# Patient Record
Sex: FEMALE | Race: WHITE | NOT HISPANIC OR LATINO | Employment: OTHER | URBAN - METROPOLITAN AREA
[De-identification: names, ages, dates, MRNs, and addresses within clinical notes are randomized per-mention and may not be internally consistent; named-entity substitution may affect disease eponyms.]

---

## 2017-05-11 ENCOUNTER — TRANSCRIBE ORDERS (OUTPATIENT)
Dept: ADMINISTRATIVE | Facility: HOSPITAL | Age: 77
End: 2017-05-11

## 2017-05-11 DIAGNOSIS — Z00.00 ROUTINE CHECK-UP: Primary | ICD-10-CM

## 2017-05-30 ENCOUNTER — HOSPITAL ENCOUNTER (OUTPATIENT)
Dept: BONE DENSITY | Facility: IMAGING CENTER | Age: 77
Discharge: HOME | End: 2017-05-30
Payer: COMMERCIAL

## 2017-05-30 DIAGNOSIS — Z00.00 ROUTINE CHECK-UP: ICD-10-CM

## 2017-05-30 PROCEDURE — 77080 DXA BONE DENSITY AXIAL: CPT

## 2018-11-25 ENCOUNTER — HOSPITAL ENCOUNTER (EMERGENCY)
Facility: HOSPITAL | Age: 78
Discharge: HOME | End: 2018-11-25
Attending: EMERGENCY MEDICINE | Admitting: EMERGENCY MEDICINE
Payer: COMMERCIAL

## 2018-11-25 VITALS
RESPIRATION RATE: 22 BRPM | WEIGHT: 138 LBS | HEIGHT: 67 IN | BODY MASS INDEX: 21.66 KG/M2 | HEART RATE: 96 BPM | OXYGEN SATURATION: 98 % | TEMPERATURE: 98.7 F | DIASTOLIC BLOOD PRESSURE: 95 MMHG | SYSTOLIC BLOOD PRESSURE: 192 MMHG

## 2018-11-25 DIAGNOSIS — I10 HYPERTENSION: ICD-10-CM

## 2018-11-25 DIAGNOSIS — R51.9 HEADACHE: Primary | ICD-10-CM

## 2018-11-25 LAB
ALBUMIN SERPL BCP-MCNC: 3.9 G/DL (ref 3.5–5)
ALP SERPL-CCNC: 93 U/L (ref 46–116)
ALT SERPL W P-5'-P-CCNC: 32 U/L (ref 12–78)
ANION GAP SERPL CALCULATED.3IONS-SCNC: 8 MMOL/L (ref 4–13)
AST SERPL W P-5'-P-CCNC: 20 U/L (ref 5–45)
ATRIAL RATE: 97 BPM
BASOPHILS # BLD AUTO: 0.02 THOUSANDS/ΜL (ref 0–0.1)
BASOPHILS NFR BLD AUTO: 0 % (ref 0–1)
BILIRUB SERPL-MCNC: 0.4 MG/DL (ref 0.2–1)
BUN SERPL-MCNC: 18 MG/DL (ref 5–25)
CALCIUM SERPL-MCNC: 9 MG/DL (ref 8.3–10.1)
CHLORIDE SERPL-SCNC: 104 MMOL/L (ref 100–108)
CO2 SERPL-SCNC: 31 MMOL/L (ref 21–32)
CREAT SERPL-MCNC: 0.84 MG/DL (ref 0.6–1.3)
EOSINOPHIL # BLD AUTO: 0.01 THOUSAND/ΜL (ref 0–0.61)
EOSINOPHIL NFR BLD AUTO: 0 % (ref 0–6)
ERYTHROCYTE [DISTWIDTH] IN BLOOD BY AUTOMATED COUNT: 14.4 % (ref 11.6–15.1)
GFR SERPL CREATININE-BSD FRML MDRD: 66 ML/MIN/1.73SQ M
GLUCOSE SERPL-MCNC: 117 MG/DL (ref 65–140)
HCT VFR BLD AUTO: 43.1 % (ref 34.8–46.1)
HGB BLD-MCNC: 13.8 G/DL (ref 11.5–15.4)
IMM GRANULOCYTES # BLD AUTO: 0.01 THOUSAND/UL (ref 0–0.2)
IMM GRANULOCYTES NFR BLD AUTO: 0 % (ref 0–2)
LYMPHOCYTES # BLD AUTO: 0.83 THOUSANDS/ΜL (ref 0.6–4.47)
LYMPHOCYTES NFR BLD AUTO: 17 % (ref 14–44)
MAGNESIUM SERPL-MCNC: 2.1 MG/DL (ref 1.6–2.6)
MCH RBC QN AUTO: 30.3 PG (ref 26.8–34.3)
MCHC RBC AUTO-ENTMCNC: 32 G/DL (ref 31.4–37.4)
MCV RBC AUTO: 95 FL (ref 82–98)
MONOCYTES # BLD AUTO: 0.22 THOUSAND/ΜL (ref 0.17–1.22)
MONOCYTES NFR BLD AUTO: 5 % (ref 4–12)
NEUTROPHILS # BLD AUTO: 3.68 THOUSANDS/ΜL (ref 1.85–7.62)
NEUTS SEG NFR BLD AUTO: 78 % (ref 43–75)
NRBC BLD AUTO-RTO: 0 /100 WBCS
P AXIS: 74 DEGREES
PLATELET # BLD AUTO: 184 THOUSANDS/UL (ref 149–390)
PMV BLD AUTO: 10.3 FL (ref 8.9–12.7)
POTASSIUM SERPL-SCNC: 4.4 MMOL/L (ref 3.5–5.3)
PR INTERVAL: 162 MS
PROT SERPL-MCNC: 7.5 G/DL (ref 6.4–8.2)
QRS AXIS: 63 DEGREES
QRSD INTERVAL: 104 MS
QT INTERVAL: 380 MS
QTC INTERVAL: 482 MS
RBC # BLD AUTO: 4.55 MILLION/UL (ref 3.81–5.12)
SODIUM SERPL-SCNC: 143 MMOL/L (ref 136–145)
T WAVE AXIS: 77 DEGREES
VENTRICULAR RATE: 97 BPM
WBC # BLD AUTO: 4.77 THOUSAND/UL (ref 4.31–10.16)

## 2018-11-25 PROCEDURE — 83735 ASSAY OF MAGNESIUM: CPT | Performed by: EMERGENCY MEDICINE

## 2018-11-25 PROCEDURE — 93005 ELECTROCARDIOGRAM TRACING: CPT

## 2018-11-25 PROCEDURE — 96374 THER/PROPH/DIAG INJ IV PUSH: CPT

## 2018-11-25 PROCEDURE — 80053 COMPREHEN METABOLIC PANEL: CPT | Performed by: EMERGENCY MEDICINE

## 2018-11-25 PROCEDURE — 99284 EMERGENCY DEPT VISIT MOD MDM: CPT

## 2018-11-25 PROCEDURE — 85025 COMPLETE CBC W/AUTO DIFF WBC: CPT | Performed by: EMERGENCY MEDICINE

## 2018-11-25 PROCEDURE — 93010 ELECTROCARDIOGRAM REPORT: CPT | Performed by: INTERNAL MEDICINE

## 2018-11-25 PROCEDURE — 36415 COLL VENOUS BLD VENIPUNCTURE: CPT | Performed by: EMERGENCY MEDICINE

## 2018-11-25 RX ORDER — HYDRALAZINE HYDROCHLORIDE 20 MG/ML
10 INJECTION INTRAMUSCULAR; INTRAVENOUS ONCE
Status: COMPLETED | OUTPATIENT
Start: 2018-11-25 | End: 2018-11-25

## 2018-11-25 RX ADMIN — HYDRALAZINE HYDROCHLORIDE 10 MG: 20 INJECTION INTRAMUSCULAR; INTRAVENOUS at 14:25

## 2018-11-25 NOTE — ED PROVIDER NOTES
History  Chief Complaint   Patient presents with    Hypertension     Patient present sto the ER stating she was sent from care now for hypertension     Patient complains of red eyes 2 days now then mild frontal headache last night  Taking herbal eye drops without relief  Checked BP this morning and it was elevated  Prior to Admission Medications   Prescriptions Last Dose Informant Patient Reported? Taking? ARMOUR THYROID PO   Yes Yes   Sig: Take by mouth      Facility-Administered Medications: None       Past Medical History:   Diagnosis Date    Arthritis     Disease of thyroid gland     Hypertension        History reviewed  No pertinent surgical history  History reviewed  No pertinent family history  I have reviewed and agree with the history as documented  Social History   Substance Use Topics    Smoking status: Never Smoker    Smokeless tobacco: Never Used    Alcohol use Yes        Review of Systems   All other systems reviewed and are negative  Physical Exam  Physical Exam   Constitutional: She appears well-developed and well-nourished  HENT:   Mouth/Throat: Oropharynx is clear and moist    Eyes: Pupils are equal, round, and reactive to light  Conjunctivae and EOM are normal    Neck: Normal range of motion  Neck supple  No spinous process tenderness present  Cardiovascular: Normal rate, regular rhythm, normal heart sounds and intact distal pulses  Pulmonary/Chest: Effort normal and breath sounds normal  No respiratory distress  She has no wheezes  Abdominal: Soft  Bowel sounds are normal  She exhibits no distension  There is no tenderness  Musculoskeletal: Normal range of motion  Neurological: She is alert  She has normal strength  No sensory deficit  GCS eye subscore is 4  GCS verbal subscore is 5  GCS motor subscore is 6  Skin: Skin is warm and dry  No rash noted  Psychiatric: Her mood appears anxious  Nursing note and vitals reviewed        Vital Signs  ED Triage Vitals   Temperature Pulse Respirations Blood Pressure SpO2   11/25/18 1157 11/25/18 1157 11/25/18 1157 11/25/18 1157 11/25/18 1157   98 7 °F (37 1 °C) (!) 108 20 (!) 209/109 97 %      Temp Source Heart Rate Source Patient Position - Orthostatic VS BP Location FiO2 (%)   11/25/18 1157 11/25/18 1157 11/25/18 1157 11/25/18 1157 --   Temporal Monitor Sitting Right arm       Pain Score       11/25/18 1150       No Pain           Vitals:    11/25/18 1425 11/25/18 1430 11/25/18 1445 11/25/18 1500   BP: (!) 210/99 (!) 210/102 (!) 192/95    Pulse:  97 94 96   Patient Position - Orthostatic VS:           Visual Acuity      ED Medications  Medications   hydrALAZINE (APRESOLINE) injection 10 mg (10 mg Intravenous Given 11/25/18 1425)       Diagnostic Studies  Results Reviewed     Procedure Component Value Units Date/Time    Magnesium [[id number]]  (Normal) Collected:  11/25/18 1237    Lab Status:  Final result Specimen:  Blood from Arm, Left Updated:  11/25/18 1412     Magnesium 2 1 mg/dL     Comprehensive metabolic panel [[id number]] Collected:  11/25/18 1237    Lab Status:  Final result Specimen:  Blood from Arm, Left Updated:  11/25/18 1306     Sodium 143 mmol/L      Potassium 4 4 mmol/L      Chloride 104 mmol/L      CO2 31 mmol/L      ANION GAP 8 mmol/L      BUN 18 mg/dL      Creatinine 0 84 mg/dL      Glucose 117 mg/dL      Calcium 9 0 mg/dL      AST 20 U/L      ALT 32 U/L      Alkaline Phosphatase 93 U/L      Total Protein 7 5 g/dL      Albumin 3 9 g/dL      Total Bilirubin 0 40 mg/dL      eGFR 66 ml/min/1 73sq m     Narrative:         National Kidney Disease Education Program recommendations are as follows:  GFR calculation is accurate only with a steady state creatinine  Chronic Kidney disease less than 60 ml/min/1 73 sq  meters  Kidney failure less than 15 ml/min/1 73 sq  meters      CBC and differential [[id number]]  (Abnormal) Collected:  11/25/18 1237    Lab Status:  Final result Specimen:  Blood from Arm, Left Updated:  11/25/18 1248     WBC 4 77 Thousand/uL      RBC 4 55 Million/uL      Hemoglobin 13 8 g/dL      Hematocrit 43 1 %      MCV 95 fL      MCH 30 3 pg      MCHC 32 0 g/dL      RDW 14 4 %      MPV 10 3 fL      Platelets 496 Thousands/uL      nRBC 0 /100 WBCs      Neutrophils Relative 78 (H) %      Immat GRANS % 0 %      Lymphocytes Relative 17 %      Monocytes Relative 5 %      Eosinophils Relative 0 %      Basophils Relative 0 %      Neutrophils Absolute 3 68 Thousands/µL      Immature Grans Absolute 0 01 Thousand/uL      Lymphocytes Absolute 0 83 Thousands/µL      Monocytes Absolute 0 22 Thousand/µL      Eosinophils Absolute 0 01 Thousand/µL      Basophils Absolute 0 02 Thousands/µL                  No orders to display              Procedures  ECG 12 Lead Documentation  Date/Time: 11/25/2018 7:27 PM  Performed by: Michael Mckeon  Authorized by: Michael Mckeon     Indications / Diagnosis:  Htn  ECG reviewed by me, the ED Provider: yes    Patient location:  ED  Previous ECG:     Previous ECG:  Compared to current    Comparison ECG info:  2015    Similarity:  Changes noted  Interpretation:     Interpretation: non-specific    Rate:     ECG rate:  97    ECG rate assessment: normal    Rhythm:     Rhythm: sinus rhythm    Ectopy:     Ectopy: none    QRS:     QRS axis:  Normal    QRS intervals:  Normal  Conduction:     Conduction: abnormal      Abnormal conduction: incomplete RBBB    ST segments:     ST segments:  Normal  T waves:     T waves: normal    Other findings:     Other findings: DEANNE and prolonged qTc interval             Phone Contacts  ED Phone Contact    ED Course  ED Course as of Nov 25 2020   Sun Nov 25, 2018   1424 Patient denies anxiety but appears very anxious    1502 Patient states that she has white coat syndrome                                MDM  Number of Diagnoses or Management Options  Headache: new and requires workup  Hypertension: new and requires workup     Amount and/or Complexity of Data Reviewed  Clinical lab tests: ordered and reviewed    Patient Progress  Patient progress: improved    CritCare Time    Disposition  Final diagnoses:   Headache   Hypertension     Time reflects when diagnosis was documented in both MDM as applicable and the Disposition within this note     Time User Action Codes Description Comment    11/25/2018  2:31 PM Dana Peter Add [R51] Headache     11/25/2018  2:31 PM Dina Cruz Hypertension       ED Disposition     ED Disposition Condition Comment    Discharge  Brocknurys Ramirezpushpa discharge to home/self care  Condition at discharge: Good        Follow-up Information     Follow up With Specialties Details Why Contact Info Additional Information    Lucrecia Mendez DO Family Medicine In 1 day  [facility] [address] [facility]   [address] [facility] [address]  [address]  Cardiology [phone], [address] [place], [place], [address]          Discharge Medication List as of 11/25/2018  3:02 PM      CONTINUE these medications which have NOT CHANGED    Details   ARMOUR THYROID PO Take by mouth, Historical Med           No discharge procedures on file      ED Provider  Electronically Signed by           Johnny Diaz DO  11/25/18 2020

## 2018-11-25 NOTE — ED NOTES
Pt  Discharged home  Discharge instructions reviewed, understanding verbalized  Pt  Left ED with her family member        Lindsay Conley RN  11/25/18 1133

## 2019-01-25 ENCOUNTER — TRANSCRIBE ORDERS (OUTPATIENT)
Dept: ADMINISTRATIVE | Facility: HOSPITAL | Age: 79
End: 2019-01-25

## 2019-01-25 ENCOUNTER — OFFICE VISIT (OUTPATIENT)
Dept: CARDIOLOGY CLINIC | Facility: CLINIC | Age: 79
End: 2019-01-25
Payer: COMMERCIAL

## 2019-01-25 VITALS
WEIGHT: 140 LBS | BODY MASS INDEX: 21.97 KG/M2 | SYSTOLIC BLOOD PRESSURE: 198 MMHG | HEIGHT: 67 IN | HEART RATE: 84 BPM | DIASTOLIC BLOOD PRESSURE: 88 MMHG

## 2019-01-25 DIAGNOSIS — R01.1 MURMUR: Primary | ICD-10-CM

## 2019-01-25 PROCEDURE — 99203 OFFICE O/P NEW LOW 30 MIN: CPT | Performed by: INTERNAL MEDICINE

## 2019-01-25 RX ORDER — LEVOTHYROXINE, LIOTHYRONINE 19; 4.5 UG/1; UG/1
30 TABLET ORAL DAILY
Refills: 0 | COMMUNITY
Start: 2019-01-14 | End: 2021-03-29

## 2019-01-25 RX ORDER — LISINOPRIL 5 MG/1
TABLET ORAL
Refills: 0 | COMMUNITY
Start: 2019-01-23

## 2019-01-25 RX ORDER — LEVOTHYROXINE, LIOTHYRONINE 9.5; 2.25 UG/1; UG/1
15 TABLET ORAL EVERY EVENING
Refills: 0 | COMMUNITY
Start: 2019-01-14 | End: 2021-03-29

## 2019-01-25 RX ORDER — MELATONIN 3 MG
10 TABLET ORAL
COMMUNITY

## 2019-01-25 RX ORDER — GLUCOSA SU 2KCL/CHONDROITIN SU 500-400 MG
CAPSULE ORAL
COMMUNITY

## 2019-01-25 NOTE — PROGRESS NOTES
Consultation - Cardiology   Sabina Sellers [de-identified] y o  female MRN: [id number]    Encounter: [id number]    Assessment/Plan     Assessment:       HTN:       Plan:    Her resting BP at home is typically within a controlled range  She is high here today but that is not unusual for her  She does have a murmur c/w mild aortic stenosis and will do an echocardiogram      History of Present Illness   Physician Requesting Consult: No att  providers found  Reason for Consult / Principal Problem: Hypertension      HPI: Sabina Sellers is a [de-identified]y o  year old female who presents with a history of Hypertension  She has been on Lisinopril since the end of November  She did have an ER visit due to uncontrolled HTN  She started checking her blood pressure at home  Her resting BP at home has been in the 580 - 799 systolic when resting  She has no chest discomfort, no dyspnea and no palpitations  She has done lifeline that revealed mild carotid atherosclerosis  She has no prior history of CHF, CAD or arrhythmia  Mother had Acute MI in her late 45s  Review of Systems   Constitution: Negative  HENT: Negative  Eyes: Negative  Cardiovascular: Negative  Respiratory: Negative  Endocrine: Negative  Hematologic/Lymphatic: Negative  Skin: Negative  Musculoskeletal: Negative  Gastrointestinal: Negative  Genitourinary: Negative  Neurological: Negative  Psychiatric/Behavioral: Negative  Allergic/Immunologic: Negative  Historical Information   Past Medical History:   Diagnosis Date    Arthritis     Disease of thyroid gland     Hypertension      No past surgical history on file  Social History:  History   Alcohol Use    Yes     History   Drug Use No     History   Smoking Status    Never Smoker   Smokeless Tobacco    Never Used       Family History:   No family history on file      Meds/Allergies   Allergies   Allergen Reactions    Sulfur-Salicylic Acid [Salicylic Acid-Sulfur] Swelling Current Outpatient Prescriptions:     ARMOUR THYROID PO, Take 120 mg by mouth  , Disp: , Rfl:     Cholecalciferol (VITAMIN D3 PO), Take 25 mg by mouth, Disp: , Rfl:     Coenzyme Q10 (CO Q10) 100 MG CAPS, Take by mouth, Disp: , Rfl:     DHEA 10 MG TABS, Take 10 mg by mouth, Disp: , Rfl:     lisinopril (ZESTRIL) 5 mg tablet, , Disp: , Rfl: 0    NP THYROID 15 MG tablet, Take 15 mg by mouth every evening, Disp: , Rfl: 0    NP THYROID 30 MG tablet, Take 30 mg by mouth daily, Disp: , Rfl: 0    Zinc Sulfate (ZINC 15 PO), Take by mouth, Disp: , Rfl:     Vitals:   Pulse: 84  Blood Pressue: (!) 198/88  Weight: 63 5 kg (140 lb)      Physical Exam   Constitutional: She is oriented to person, place, and time  No distress  HENT:   Mouth/Throat: No oropharyngeal exudate  Eyes: No scleral icterus  Neck: No JVD present  Cardiovascular: Normal rate and regular rhythm  No murmur heard  Pulmonary/Chest: Effort normal and breath sounds normal  No respiratory distress  She has no wheezes  She has no rales  Abdominal: Soft  Bowel sounds are normal  She exhibits no distension  There is no tenderness  There is no rebound  Musculoskeletal: She exhibits no edema  Neurological: She is alert and oriented to person, place, and time  Skin: Skin is warm and dry  She is not diaphoretic  Psychiatric: She has a normal mood and affect  Her behavior is normal        [unfilled]    Invasive Devices     Peripheral Intravenous Line            Peripheral IV 11/25/18 Left Antecubital 60 days                Lab Results   Component Value Date     11/25/2018    CO2 31 11/25/2018    BUN 18 11/25/2018    CREATININE 0 84 11/25/2018    EGFR 66 11/25/2018    CALCIUM 9 0 11/25/2018    AST 20 11/25/2018    ALT 32 11/25/2018    ALKPHOS 93 11/25/2018     Lab Results   Component Value Date    WBC 4 77 11/25/2018    HGB 13 8 11/25/2018     11/25/2018     No components found for: TROP    Imaging:     EKG: NSR   Incomplete RBBB  LVH  Counseling / Coordination of Care  Total floor / unit time spent today 45 minutes  Greater than 50% of total time was spent with the patient and / or family counseling and / or coordination of care  A description of the counseling / coordination of care

## 2019-02-15 ENCOUNTER — HOSPITAL ENCOUNTER (OUTPATIENT)
Dept: NON INVASIVE DIAGNOSTICS | Facility: CLINIC | Age: 79
Discharge: HOME | End: 2019-02-15
Payer: COMMERCIAL

## 2019-02-15 DIAGNOSIS — R01.1 MURMUR: ICD-10-CM

## 2019-02-15 PROCEDURE — 93306 TTE W/DOPPLER COMPLETE: CPT

## 2019-02-15 PROCEDURE — 93306 TTE W/DOPPLER COMPLETE: CPT | Performed by: INTERNAL MEDICINE

## 2019-02-18 ENCOUNTER — TELEPHONE (OUTPATIENT)
Dept: CARDIOLOGY CLINIC | Facility: CLINIC | Age: 79
End: 2019-02-18

## 2019-02-18 NOTE — TELEPHONE ENCOUNTER
Called pt - message relayed as given  Last OV and Echo routed to PCP per pt's request '  No questions at this time

## 2019-02-18 NOTE — TELEPHONE ENCOUNTER
----- Message from Shelia Molina MD sent at 2/18/2019 10:15 AM EST -----  Mild aortic stenosis  Overall looks good

## 2019-05-02 ENCOUNTER — TELEPHONE (OUTPATIENT)
Dept: GASTROENTEROLOGY | Facility: CLINIC | Age: 79
End: 2019-05-02

## 2019-11-25 ENCOUNTER — CLINICAL SUPPORT (OUTPATIENT)
Dept: GASTROENTEROLOGY | Facility: CLINIC | Age: 79
End: 2019-11-25

## 2019-11-25 VITALS — BODY MASS INDEX: 21.97 KG/M2 | HEIGHT: 67 IN | WEIGHT: 140 LBS

## 2019-11-25 DIAGNOSIS — Z86.010 HISTORY OF COLON POLYPS: Primary | ICD-10-CM

## 2019-11-25 NOTE — PROGRESS NOTES
Pt seen for colon prep dx history of polyps  Meds reviewed with pt  Instructions given  Screening form signed  Suprep rx sent to pharmacy   Colon bmec 1/14/20 ZARI

## 2019-12-05 ENCOUNTER — TELEPHONE (OUTPATIENT)
Dept: GASTROENTEROLOGY | Facility: CLINIC | Age: 79
End: 2019-12-05

## 2019-12-05 NOTE — TELEPHONE ENCOUNTER
Vm box is full  Could not leave a message  Per RA, Suprep is not covered  I put a sample up front for pt to

## 2020-01-06 NOTE — TELEPHONE ENCOUNTER
Pt called, pharmacy said prep not covered by insurance  Will be $100  Told her we were trying to get a hold of her to offer her a sample  She will pick it up in the office tomorrow

## 2021-01-18 ENCOUNTER — OFFICE VISIT (OUTPATIENT)
Dept: VASCULAR SURGERY | Facility: CLINIC | Age: 81
End: 2021-01-18
Payer: COMMERCIAL

## 2021-01-18 VITALS
RESPIRATION RATE: 17 BRPM | DIASTOLIC BLOOD PRESSURE: 70 MMHG | TEMPERATURE: 98.7 F | HEART RATE: 113 BPM | HEIGHT: 67 IN | BODY MASS INDEX: 22.6 KG/M2 | WEIGHT: 144 LBS | SYSTOLIC BLOOD PRESSURE: 126 MMHG

## 2021-01-18 DIAGNOSIS — I83.893 SYMPTOMATIC VARICOSE VEINS OF BOTH LOWER EXTREMITIES: Primary | ICD-10-CM

## 2021-01-18 PROCEDURE — 99203 OFFICE O/P NEW LOW 30 MIN: CPT | Performed by: NURSE PRACTITIONER

## 2021-01-18 NOTE — PROGRESS NOTES
Assessment/Plan:    Symptomatic varicose veins of both lower extremities    26-year-old female with hypothyroid and longstanding bilateral extremity varicosities referred for vascular evaluation   -Intermittent discomfort associated with varicosities   -We discussed physiology of venous disease, available treatment options and indications for treatment   -Recommended conservative measures   -Rx light  Gradient compression  Socks given   -Frequent ambulation and periodic leg elevation  -Follow up in the office in 2 months to check symptoms with the use of compression       Diagnoses and all orders for this visit:    Symptomatic varicose veins of both lower extremities  -     Compression Stocking          Subjective:      Patient ID: Lexi Alston is a 78 y o  female  Patient presents in office today as a new varicose vein patient who was referral by Dylan Rodriguez  Patient c/o of having B/L spider veins and varicose veins, that occur in her 70's  Patient reports that at times they can be painful when walking  Patient has not worn any compression stockings but does elevate her legs at night  Patient denies having numbness or tingling in her legs  HPI   patient presents to the office for evaluation bilateral lower extremity varicosities  She has longstanding history of bilateral lower extremity varicosities  In the last several months she has noticed they have become increasingly tender  She has Diffusely scattered bilateral lower extremity varicosities  No clinical evidence of superficial thrombophlebitis  No significant lower extremity swelling  The following portions of the patient's history were reviewed and updated as appropriate: allergies, current medications, past family history, past medical history, past social history, past surgical history and problem list   ROS reviewed     Review of Systems   Constitutional: Negative  HENT: Negative  Eyes: Positive for redness     Respiratory: Negative  Cardiovascular: Positive for leg swelling (Ankles)  Painful Veins   Gastrointestinal: Negative  Endocrine: Negative  Genitourinary: Negative  Musculoskeletal: Positive for arthralgias and back pain  Leg Pain    Skin: Negative  Allergic/Immunologic: Positive for environmental allergies  Neurological: Negative  Hematological: Negative  Psychiatric/Behavioral: Negative  Objective:    I have reviewed and made appropriate changes to the review of systems input by the medical assistant  Vitals:    01/18/21 1009   BP: 126/70   BP Location: Left arm   Patient Position: Sitting   Cuff Size: Adult   Pulse: (!) 113   Resp: 17   Temp: 98 7 °F (37 1 °C)   TempSrc: Tympanic   Weight: 65 3 kg (144 lb)   Height: 5' 6 5" (1 689 m)       Patient Active Problem List   Diagnosis    Symptomatic varicose veins of both lower extremities       History reviewed  No pertinent surgical history  Family History   Problem Relation Age of Onset    Heart attack Mother     No Known Problems Father        Social History     Socioeconomic History    Marital status:       Spouse name: Not on file    Number of children: Not on file    Years of education: Not on file    Highest education level: Not on file   Occupational History    Not on file   Social Needs    Financial resource strain: Not on file    Food insecurity     Worry: Not on file     Inability: Not on file    Transportation needs     Medical: Not on file     Non-medical: Not on file   Tobacco Use    Smoking status: Never Smoker    Smokeless tobacco: Never Used   Substance and Sexual Activity    Alcohol Use     Frequency: Never    Drug use: No    Sexual activity: Never   Lifestyle    Physical activity     Days per week: Not on file     Minutes per session: Not on file    Stress: Not on file   Relationships    Social connections     Talks on phone: Not on file     Gets together: Not on file     Attends [employer] service: Not on file     Active member of club or organization: Not on file     Attends meetings of clubs or organizations: Not on file     Relationship status: Not on file    Intimate partner violence     Fear of current or ex partner: Not on file     Emotionally abused: Not on file     Physically abused: Not on file     Forced sexual activity: Not on file   Other Topics Concern    Not on file   Social History Narrative    Not on file       Allergies   Allergen Reactions    Sulfur-Salicylic Acid [Salicylic Acid-Sulfur] Swelling         Current Outpatient Medications:     ARMOUR THYROID PO, Take 120 mg by mouth  , Disp: , Rfl:     Cholecalciferol (VITAMIN D3 PO), Take 25 mg by mouth, Disp: , Rfl:     Coenzyme Q10 (CO Q10) 100 MG CAPS, Take by mouth, Disp: , Rfl:     DHEA 10 MG TABS, Take 10 mg by mouth, Disp: , Rfl:     lisinopril (ZESTRIL) 5 mg tablet, , Disp: , Rfl: 0    Na Sulfate-K Sulfate-Mg Sulf (SUPREP BOWEL PREP KIT) 17 5-3 13-1 6 GM/177ML SOLN, Use as directed, Disp: 2 Bottle, Rfl: 0    NP THYROID 15 MG tablet, Take 15 mg by mouth every evening, Disp: , Rfl: 0    NP THYROID 30 MG tablet, Take 30 mg by mouth daily, Disp: , Rfl: 0    Zinc Sulfate (ZINC 15 PO), Take by mouth, Disp: , Rfl:     /70 (BP Location: Left arm, Patient Position: Sitting, Cuff Size: Adult)   Pulse (!) 113   Temp 98 7 °F (37 1 °C) (Tympanic)   Resp 17   Ht 5' 6 5" (1 689 m)   Wt 65 3 kg (144 lb)   BMI 22 89 kg/m²          Physical Exam  Vitals signs and nursing note reviewed  Constitutional:       Appearance: Normal appearance  Eyes:      Extraocular Movements: Extraocular movements intact  Cardiovascular:      Pulses: Normal pulses  Pulmonary:      Effort: Pulmonary effort is normal    Skin:     General: Skin is warm  Neurological:      General: No focal deficit present  Mental Status: She is alert and oriented to person, place, and time     Psychiatric:         Mood and Affect: Mood normal

## 2021-01-18 NOTE — PATIENT INSTRUCTIONS
Varicose Veins   AMBULATORY CARE:   Varicose veins  are veins that become large, twisted, and swollen  They are common on the back of the calves, knees, and thighs  Varicose veins are caused by valves in your veins that do not work properly  This causes blood to collect and increase pressure in the veins of your legs  The increased pressure causes your veins to stretch, get larger, swell, and twist   Common symptoms include the following: Your symptoms may be worse after you stand or sit for long periods of time  You may have any of the following:  · Blue, purple, or bulging veins in your legs     · Pain, swelling, or muscle cramps in your legs    · Feeling of fatigue or heaviness in your legs    · Cramping in your legs    Seek care immediately if:   · You have a wound that does not heal or is infected  · You have an injury that has broken your skin and caused your varicose veins to bleed  · Your leg is swollen and hard  · You notice that your legs or feet are turning blue or black  · Your leg feels warm, tender, and painful  It may look swollen and red  Contact your healthcare provider if:   · You have pain in your leg that does not go away or gets worse  · You notice sudden large bruising on your legs  · You have a rash on your leg  · Your symptoms keep you from doing your daily activities  · You have questions or concerns about your condition or care  Treatment of varicose veins  aims to decrease symptoms, improve appearance, and prevent further problems  Treatment will depend on which veins are affected and how severe your condition is  You may need procedures to treat or remove your varicose veins  For example, your healthcare provider may inject a solution or use a laser to close the varicose veins  Surgery to remove long veins may also be done  Ask your healthcare provider for more information about procedures used to treat varicose veins    Manage varicose veins:   · Do not sit or stand for long periods of time  This can cause the blood to collect in your legs and make your symptoms worse  Bend or rotate your ankles several times every hour  Walk around for a few minutes every hour to get blood moving in your legs  · Do not cross your legs when you sit  This decreases blood flow to your feet and can make your symptoms worse  · Do not wear tight clothing or shoes  Do not wear high-heeled shoes  Do not wear clothes that are tight around the waist or knees  · Maintain a healthy weight  Being overweight or obese can make your varicose veins worse  Ask your healthcare provider how much you should weigh  Ask him or her to help you create a weight loss plan if you are overweight  · Wear pressure stockings as directed  The stockings are tight and put pressure on your legs  They improve blood flow and help prevent clots  · Elevate your legs  Keep them above the level of your heart for 15 to 30 minutes several times a day  You can also prop the end of your bed up slightly to elevate your legs while you sleep  This will help blood to flow back to your heart  · Get regular exercise  Talk to your healthcare provider about the best exercise plan for you  Exercise can improve blood flow to your legs and feet  Follow up with your healthcare provider as directed:  Write down your questions so you remember to ask them during your visits  © Copyright [address] Information is for End User's use only and may not be sold, redistributed or otherwise used for commercial purposes  All illustrations and images included in CareNotes® are the copyrighted property of A D A M , Inc  or [address] [place] [address] [employer]   The above information is an  only  It is not intended as medical advice for individual conditions or treatments  Talk to your doctor, nurse or pharmacist before following any medical regimen to see if it is safe and effective for you

## 2021-01-23 ENCOUNTER — HOSPITAL ENCOUNTER (EMERGENCY)
Facility: HOSPITAL | Age: 81
Discharge: HOME | End: 2021-01-23
Attending: EMERGENCY MEDICINE
Payer: COMMERCIAL

## 2021-01-23 ENCOUNTER — APPOINTMENT (EMERGENCY)
Dept: RADIOLOGY | Facility: HOSPITAL | Age: 81
End: 2021-01-23
Payer: COMMERCIAL

## 2021-01-23 VITALS
DIASTOLIC BLOOD PRESSURE: 77 MMHG | TEMPERATURE: 98.1 F | OXYGEN SATURATION: 98 % | HEART RATE: 93 BPM | BODY MASS INDEX: 22.89 KG/M2 | SYSTOLIC BLOOD PRESSURE: 168 MMHG | RESPIRATION RATE: 20 BRPM | WEIGHT: 144 LBS

## 2021-01-23 DIAGNOSIS — R51.9 HEADACHE: ICD-10-CM

## 2021-01-23 DIAGNOSIS — I10 HYPERTENSION: Primary | ICD-10-CM

## 2021-01-23 LAB
ALBUMIN SERPL BCP-MCNC: 3.8 G/DL (ref 3.5–5)
ALP SERPL-CCNC: 82 U/L (ref 46–116)
ALT SERPL W P-5'-P-CCNC: 35 U/L (ref 12–78)
ANION GAP SERPL CALCULATED.3IONS-SCNC: 7 MMOL/L (ref 4–13)
AST SERPL W P-5'-P-CCNC: 21 U/L (ref 5–45)
ATRIAL RATE: 105 BPM
BASOPHILS # BLD AUTO: 0.01 THOUSANDS/ΜL (ref 0–0.1)
BASOPHILS NFR BLD AUTO: 0 % (ref 0–1)
BILIRUB SERPL-MCNC: 0.4 MG/DL (ref 0.2–1)
BILIRUB UR QL STRIP: NEGATIVE
BUN SERPL-MCNC: 19 MG/DL (ref 5–25)
CALCIUM SERPL-MCNC: 9.1 MG/DL (ref 8.3–10.1)
CHLORIDE SERPL-SCNC: 106 MMOL/L (ref 100–108)
CLARITY UR: CLEAR
CO2 SERPL-SCNC: 29 MMOL/L (ref 21–32)
COLOR UR: YELLOW
CREAT SERPL-MCNC: 0.9 MG/DL (ref 0.6–1.3)
EOSINOPHIL # BLD AUTO: 0.02 THOUSAND/ΜL (ref 0–0.61)
EOSINOPHIL NFR BLD AUTO: 0 % (ref 0–6)
ERYTHROCYTE [DISTWIDTH] IN BLOOD BY AUTOMATED COUNT: 13.7 % (ref 11.6–15.1)
GFR SERPL CREATININE-BSD FRML MDRD: 60 ML/MIN/1.73SQ M
GLUCOSE SERPL-MCNC: 123 MG/DL (ref 65–140)
GLUCOSE UR STRIP-MCNC: NEGATIVE MG/DL
HCT VFR BLD AUTO: 42.3 % (ref 34.8–46.1)
HGB BLD-MCNC: 13.6 G/DL (ref 11.5–15.4)
HGB UR QL STRIP.AUTO: NEGATIVE
IMM GRANULOCYTES # BLD AUTO: 0.01 THOUSAND/UL (ref 0–0.2)
IMM GRANULOCYTES NFR BLD AUTO: 0 % (ref 0–2)
KETONES UR STRIP-MCNC: NEGATIVE MG/DL
LEUKOCYTE ESTERASE UR QL STRIP: NEGATIVE
LYMPHOCYTES # BLD AUTO: 1.07 THOUSANDS/ΜL (ref 0.6–4.47)
LYMPHOCYTES NFR BLD AUTO: 24 % (ref 14–44)
MCH RBC QN AUTO: 30.1 PG (ref 26.8–34.3)
MCHC RBC AUTO-ENTMCNC: 32.2 G/DL (ref 31.4–37.4)
MCV RBC AUTO: 94 FL (ref 82–98)
MONOCYTES # BLD AUTO: 0.31 THOUSAND/ΜL (ref 0.17–1.22)
MONOCYTES NFR BLD AUTO: 7 % (ref 4–12)
NEUTROPHILS # BLD AUTO: 3.09 THOUSANDS/ΜL (ref 1.85–7.62)
NEUTS SEG NFR BLD AUTO: 69 % (ref 43–75)
NITRITE UR QL STRIP: NEGATIVE
NRBC BLD AUTO-RTO: 0 /100 WBCS
P AXIS: 67 DEGREES
PH UR STRIP.AUTO: 7 [PH]
PLATELET # BLD AUTO: 159 THOUSANDS/UL (ref 149–390)
PMV BLD AUTO: 10.2 FL (ref 8.9–12.7)
POTASSIUM SERPL-SCNC: 3.9 MMOL/L (ref 3.5–5.3)
PR INTERVAL: 162 MS
PROT SERPL-MCNC: 7.2 G/DL (ref 6.4–8.2)
PROT UR STRIP-MCNC: NEGATIVE MG/DL
QRS AXIS: 60 DEGREES
QRSD INTERVAL: 100 MS
QT INTERVAL: 354 MS
QTC INTERVAL: 467 MS
RBC # BLD AUTO: 4.52 MILLION/UL (ref 3.81–5.12)
SODIUM SERPL-SCNC: 142 MMOL/L (ref 136–145)
SP GR UR STRIP.AUTO: 1.01 (ref 1–1.03)
T WAVE AXIS: 76 DEGREES
T4 FREE SERPL-MCNC: 1.21 NG/DL (ref 0.76–1.46)
TROPONIN I SERPL-MCNC: <0.02 NG/ML
TSH SERPL DL<=0.05 MIU/L-ACNC: <0.007 UIU/ML (ref 0.36–3.74)
UROBILINOGEN UR QL STRIP.AUTO: 0.2 E.U./DL
VENTRICULAR RATE: 105 BPM
WBC # BLD AUTO: 4.51 THOUSAND/UL (ref 4.31–10.16)

## 2021-01-23 PROCEDURE — 93005 ELECTROCARDIOGRAM TRACING: CPT

## 2021-01-23 PROCEDURE — 84439 ASSAY OF FREE THYROXINE: CPT | Performed by: EMERGENCY MEDICINE

## 2021-01-23 PROCEDURE — 81003 URINALYSIS AUTO W/O SCOPE: CPT | Performed by: EMERGENCY MEDICINE

## 2021-01-23 PROCEDURE — 99285 EMERGENCY DEPT VISIT HI MDM: CPT | Performed by: EMERGENCY MEDICINE

## 2021-01-23 PROCEDURE — 84443 ASSAY THYROID STIM HORMONE: CPT | Performed by: EMERGENCY MEDICINE

## 2021-01-23 PROCEDURE — 80053 COMPREHEN METABOLIC PANEL: CPT | Performed by: EMERGENCY MEDICINE

## 2021-01-23 PROCEDURE — 96374 THER/PROPH/DIAG INJ IV PUSH: CPT

## 2021-01-23 PROCEDURE — 85025 COMPLETE CBC W/AUTO DIFF WBC: CPT | Performed by: EMERGENCY MEDICINE

## 2021-01-23 PROCEDURE — 93010 ELECTROCARDIOGRAM REPORT: CPT | Performed by: INTERNAL MEDICINE

## 2021-01-23 PROCEDURE — 36415 COLL VENOUS BLD VENIPUNCTURE: CPT | Performed by: EMERGENCY MEDICINE

## 2021-01-23 PROCEDURE — 99284 EMERGENCY DEPT VISIT MOD MDM: CPT

## 2021-01-23 PROCEDURE — 71045 X-RAY EXAM CHEST 1 VIEW: CPT

## 2021-01-23 PROCEDURE — 84484 ASSAY OF TROPONIN QUANT: CPT | Performed by: EMERGENCY MEDICINE

## 2021-01-23 RX ORDER — KETOROLAC TROMETHAMINE 30 MG/ML
15 INJECTION, SOLUTION INTRAMUSCULAR; INTRAVENOUS ONCE
Status: COMPLETED | OUTPATIENT
Start: 2021-01-23 | End: 2021-01-23

## 2021-01-23 RX ADMIN — KETOROLAC TROMETHAMINE 15 MG: 30 INJECTION, SOLUTION INTRAMUSCULAR; INTRAVENOUS at 16:10

## 2021-01-23 NOTE — ED PROVIDER NOTES
History  Chief Complaint   Patient presents with    Hypertension     To ED with c/o high blood pressure today  States that she has been feeling anxious and unable to breath  Denies any heaches, denies any visual changes  18-year-old female presents for evaluation high blood pressure and feeling "off"  Patient is unable to describe any specific symptoms including chest pain, shortness of breath, headache, changes in vision, abdominal pain, nausea, vomiting  Patient states she checked her blood pressure due to feeling "off" and was found to be greater than 200 which prompted her to present to the emergency department  Patient takes lisinopril 5 mg B i d  She states she took her evening dose prior to arrival            Prior to Admission Medications   Prescriptions Last Dose Informant Patient Reported? Taking? ARMOUR THYROID PO  Self Yes No   Sig: Take 120 mg by mouth     Cholecalciferol (VITAMIN D3 PO)  Self Yes No   Sig: Take 25 mg by mouth   Coenzyme Q10 (CO Q10) 100 MG CAPS  Self Yes No   Sig: Take by mouth   DHEA 10 MG TABS  Self Yes No   Sig: Take 10 mg by mouth   NP THYROID 15 MG tablet  Self Yes No   Sig: Take 15 mg by mouth every evening   NP THYROID 30 MG tablet  Self Yes No   Sig: Take 30 mg by mouth daily   Na Sulfate-K Sulfate-Mg Sulf (SUPREP BOWEL PREP KIT) 17 5-3 13-1 6 GM/177ML SOLN  Self No No   Sig: Use as directed   Zinc Sulfate (ZINC 15 PO)  Self Yes No   Sig: Take by mouth   lisinopril (ZESTRIL) 5 mg tablet  Self Yes No      Facility-Administered Medications: None       Past Medical History:   Diagnosis Date    Arthritis     Disease of thyroid gland     Hypertension        History reviewed  No pertinent surgical history  Family History   Problem Relation Age of Onset    Heart attack Mother     No Known Problems Father      I have reviewed and agree with the history as documented      E-Cigarette/Vaping    E-Cigarette Use Never User      E-Cigarette/Vaping Substances    Nicotine No     THC No     CBD No     Flavoring No     Other No     Unknown No      Social History     Tobacco Use    Smoking status: Never Smoker    Smokeless tobacco: Never Used   Substance Use Topics    Alcohol Use     Frequency: Never    Drug use: No       Review of Systems   Constitutional: Negative for chills, diaphoresis and fever  HENT: Negative for congestion and rhinorrhea  Eyes: Negative for pain and visual disturbance  Respiratory: Negative for cough, shortness of breath and wheezing  Cardiovascular: Negative for chest pain and leg swelling  Gastrointestinal: Negative for abdominal pain, diarrhea, nausea and vomiting  Genitourinary: Negative for difficulty urinating, dysuria, frequency and urgency  Musculoskeletal: Negative for back pain and neck pain  Skin: Negative for color change and rash  Neurological: Negative for syncope, numbness and headaches  All other systems reviewed and are negative  Physical Exam  Physical Exam  Vitals signs and nursing note reviewed  Constitutional:       Appearance: She is well-developed  HENT:      Head: Normocephalic and atraumatic  Eyes:      Conjunctiva/sclera: Conjunctivae normal    Neck:      Musculoskeletal: Normal range of motion and neck supple  Cardiovascular:      Rate and Rhythm: Normal rate and regular rhythm  Pulmonary:      Effort: Pulmonary effort is normal  No respiratory distress  Abdominal:      Palpations: Abdomen is soft  Tenderness: There is no abdominal tenderness  Musculoskeletal: Normal range of motion  General: No tenderness  Comments: 5/5 strength of bilateral upper and lower extremities  Able to ambulate without difficulty  Skin:     General: Skin is warm  Findings: No erythema  Neurological:      Mental Status: She is alert and oriented to person, place, and time     Psychiatric:         Behavior: Behavior normal          Vital Signs  ED Triage Vitals   Temperature Pulse Respirations Blood Pressure SpO2   01/23/21 1338 01/23/21 1338 01/23/21 1338 01/23/21 1338 01/23/21 1338   98 1 °F (36 7 °C) (!) 118 20 (!) 234/127 99 %      Temp Source Heart Rate Source Patient Position - Orthostatic VS BP Location FiO2 (%)   01/23/21 1338 01/23/21 1338 01/23/21 1338 01/23/21 1338 --   Tympanic Monitor Sitting Left arm       Pain Score       01/23/21 1615       3           Vitals:    01/23/21 1430 01/23/21 1615 01/23/21 1630 01/23/21 1700   BP: (!) 196/88 (!) 171/75 (!) 182/81 168/77   Pulse: 95 95  93   Patient Position - Orthostatic VS:             Visual Acuity      ED Medications  Medications   ketorolac (TORADOL) injection 15 mg (15 mg Intravenous Given 1/23/21 1610)       Diagnostic Studies  Results Reviewed     Procedure Component Value Units Date/Time    T4, free [[id number]]  (Normal) Collected: 01/23/21 1355    Lab Status: Final result Specimen: Blood from Arm, Right Updated: 01/23/21 1730     Free T4 1 21 ng/dL     UA w Reflex to Microscopic w Reflex to Culture [[id number]] Collected: 01/23/21 1449    Lab Status: Final result Specimen: Urine, Clean Catch Updated: 01/23/21 1458     Color, UA Yellow     Clarity, UA Clear     Specific Gravity, UA 1 010     pH, UA 7 0     Leukocytes, UA Negative     Nitrite, UA Negative     Protein, UA Negative mg/dl      Glucose, UA Negative mg/dl      Ketones, UA Negative mg/dl      Urobilinogen, UA 0 2 E U /dl      Bilirubin, UA Negative     Blood, UA Negative    TSH [[id number]]  (Abnormal) Collected: 01/23/21 1355    Lab Status: Final result Specimen: Blood from Arm, Right Updated: 01/23/21 1454     TSH 3RD GENERATON <0 007 uIU/mL     Narrative:      Patients undergoing fluorescein dye angiography may retain small amounts of fluorescein in the body for 48-72 hours post procedure  Samples containing fluorescein can produce falsely depressed TSH values  If the patient had this procedure,a specimen should be resubmitted post fluorescein clearance  Comprehensive metabolic panel [[id number]] Collected: 01/23/21 1355    Lab Status: Final result Specimen: Blood from Arm, Right Updated: 01/23/21 1426     Sodium 142 mmol/L      Potassium 3 9 mmol/L      Chloride 106 mmol/L      CO2 29 mmol/L      ANION GAP 7 mmol/L      BUN 19 mg/dL      Creatinine 0 90 mg/dL      Glucose 123 mg/dL      Calcium 9 1 mg/dL      AST 21 U/L      ALT 35 U/L      Alkaline Phosphatase 82 U/L      Total Protein 7 2 g/dL      Albumin 3 8 g/dL      Total Bilirubin 0 40 mg/dL      eGFR 60 ml/min/1 73sq m     Narrative:      National Kidney Disease Foundation guidelines for Chronic Kidney Disease (CKD):     Stage 1 with normal or high GFR (GFR > 90 mL/min/1 73 square meters)    Stage 2 Mild CKD (GFR = 60-89 mL/min/1 73 square meters)    Stage 3A Moderate CKD (GFR = 45-59 mL/min/1 73 square meters)    Stage 3B Moderate CKD (GFR = 30-44 mL/min/1 73 square meters)    Stage 4 Severe CKD (GFR = 15-29 mL/min/1 73 square meters)    Stage 5 End Stage CKD (GFR <15 mL/min/1 73 square meters)  Note: GFR calculation is accurate only with a steady state creatinine    Troponin I [[id number]]  (Normal) Collected: 01/23/21 1355    Lab Status: Final result Specimen: Blood from Arm, Right Updated: 01/23/21 1425     Troponin I <0 02 ng/mL     CBC and differential [[id number]] Collected: 01/23/21 1355    Lab Status: Final result Specimen: Blood from Arm, Right Updated: 01/23/21 1404     WBC 4 51 Thousand/uL      RBC 4 52 Million/uL      Hemoglobin 13 6 g/dL      Hematocrit 42 3 %      MCV 94 fL      MCH 30 1 pg      MCHC 32 2 g/dL      RDW 13 7 %      MPV 10 2 fL      Platelets 405 Thousands/uL      nRBC 0 /100 WBCs      Neutrophils Relative 69 %      Immat GRANS % 0 %      Lymphocytes Relative 24 %      Monocytes Relative 7 %      Eosinophils Relative 0 %      Basophils Relative 0 %      Neutrophils Absolute 3 09 Thousands/µL      Immature Grans Absolute 0 01 Thousand/uL      Lymphocytes Absolute 1 07 Thousands/µL      Monocytes Absolute 0 31 Thousand/µL      Eosinophils Absolute 0 02 Thousand/µL      Basophils Absolute 0 01 Thousands/µL                  X-ray chest 1 view portable    (Results Pending)              Procedures  Procedures         ED Course  ED Course as of Jan 23 1745   Sat Jan 23, 2021   1502 Procedure Note: EKG  Date/Time: 01/23/21 3:02 PM   Performed by: Rigoberto Good  Authorized by: Rigoberto Good  ECG interpreted by me, the ED Provider: yes   The EKG demonstrates:  Rate 105  Rhythm Sinus tach  QTc 467  No ST elevations/depressions                                  SBIRT 22yo+      Most Recent Value   SBIRT (25 yo +)   In order to provide better care to our patients, we are screening all of our patients for alcohol and drug use  Would it be okay to ask you these screening questions? Yes Filed at: 01/23/2021 1507   Initial Alcohol Screen: US AUDIT-C    1  How often do you have a drink containing alcohol? 1 Filed at: 01/23/2021 1507   2  How many drinks containing alcohol do you have on a typical day you are drinking? 0 Filed at: 01/23/2021 1507   3b  FEMALE Any Age, or MALE 65+: How often do you have 4 or more drinks on one occassion? 0 Filed at: 01/23/2021 1507   Audit-C Score  1 Filed at: 01/23/2021 1507   JOSSELYN: How many times in the past year have you    Used an illegal drug or used a prescription medication for non-medical reasons? Never Filed at: 01/23/2021 1507                    MDM  Number of Diagnoses or Management Options  Headache:   Hypertension:   Diagnosis management comments:   57-year-old female presenting with feeling off and high blood pressure  Due to patient not being able to specifically describe her symptoms, cannot rule out hypertensive emergency  Although, patient appears very well and I do not suspect and orient damage at this time    Patient also noted to be on thyroid medications and was tachycardic and hypertensive on arrival   Will assess labs, assess for thyrotoxicosis  Upon reassessment, patient reports improvement of symptoms but now has a mild frontal headache  Will administer Toradol  Discussed all labs  And results with patient  Will follow up with primary care provider  Strict return precautions discussed  Disposition  Final diagnoses:   Hypertension   Headache     Time reflects when diagnosis was documented in both MDM as applicable and the Disposition within this note     Time User Action Codes Description Comment    1/23/2021  5:30 PM Pushpa Ayala Add [I10] Hypertension     1/23/2021  5:30 PM Pushpa Ayala Add [R51 9] Headache       ED Disposition     ED Disposition Condition Date/Time Comment    Discharge Stable Sat Jan 23, 2021  5:31 PM Jessy Settles discharge to home/self care  Follow-up Information     Follow up With Specialties Details Why Contact Info Additional [address], DO Family Medicine In 3 days  [address] [phone]        [address] Emergency Department Emergency Medicine  If symptoms worsen [address] [place], [id number]  [address] [facility] Emergency Department, [address] [facility] [address]          Patient's Medications   Discharge Prescriptions    No medications on file     No discharge procedures on file      PDMP Review     None          ED Provider  Electronically Signed by           Citlalli Cochran DO  01/23/21 5451

## 2021-02-04 PROBLEM — I83.893 SYMPTOMATIC VARICOSE VEINS OF BOTH LOWER EXTREMITIES: Status: ACTIVE | Noted: 2021-02-04

## 2021-02-04 NOTE — ASSESSMENT & PLAN NOTE
78-year-old female with hypothyroid and longstanding bilateral extremity varicosities referred for vascular evaluation   -Intermittent discomfort associated with varicosities   -We discussed physiology of venous disease, available treatment options and indications for treatment   -Recommended conservative measures   -Rx light  Gradient compression  Socks given   -Frequent ambulation and periodic leg elevation  -Follow up in the office in 2 months to check symptoms with the use of compression

## 2021-03-29 ENCOUNTER — OFFICE VISIT (OUTPATIENT)
Dept: VASCULAR SURGERY | Facility: CLINIC | Age: 81
End: 2021-03-29
Payer: COMMERCIAL

## 2021-03-29 VITALS
DIASTOLIC BLOOD PRESSURE: 88 MMHG | HEIGHT: 67 IN | BODY MASS INDEX: 21.72 KG/M2 | HEART RATE: 76 BPM | WEIGHT: 138.4 LBS | SYSTOLIC BLOOD PRESSURE: 156 MMHG

## 2021-03-29 DIAGNOSIS — I83.893 SYMPTOMATIC VARICOSE VEINS OF BOTH LOWER EXTREMITIES: Primary | ICD-10-CM

## 2021-03-29 PROCEDURE — 99213 OFFICE O/P EST LOW 20 MIN: CPT | Performed by: NURSE PRACTITIONER

## 2021-03-29 RX ORDER — LIFITEGRAST 50 MG/ML
SOLUTION/ DROPS OPHTHALMIC
COMMUNITY
Start: 2021-02-16

## 2021-03-29 NOTE — PATIENT INSTRUCTIONS
Bilateral lower extremity varicosities with  Mild lower extremity swelling  She did not tolerate prescription or over-the-counter compression socks  Caused BP to be elevated and felt to constricting  Recommended frequent ambulation, periodic leg elevation and moisturizers to maintain skin integrity  Follow-up in the office as needed  Any concerns she will notify the office    Varicose Veins   AMBULATORY CARE:   Varicose veins  are veins that become large, twisted, and swollen  They are common on the back of the calves, knees, and thighs  Varicose veins are caused by valves in your veins that do not work properly  This causes blood to collect and increase pressure in the veins of your legs  The increased pressure causes your veins to stretch, get larger, swell, and twist   Common symptoms include the following: Your symptoms may be worse after you stand or sit for long periods of time  You may have any of the following:  · Blue, purple, or bulging veins in your legs     · Pain, swelling, or muscle cramps in your legs    · Feeling of fatigue or heaviness in your legs    · Cramping in your legs    Seek care immediately if:   · You have a wound that does not heal or is infected  · You have an injury that has broken your skin and caused your varicose veins to bleed  · Your leg is swollen and hard  · You notice that your legs or feet are turning blue or black  · Your leg feels warm, tender, and painful  It may look swollen and red  Contact your healthcare provider if:   · You have pain in your leg that does not go away or gets worse  · You notice sudden large bruising on your legs  · You have a rash on your leg  · Your symptoms keep you from doing your daily activities  · You have questions or concerns about your condition or care  Treatment of varicose veins  aims to decrease symptoms, improve appearance, and prevent further problems   Treatment will depend on which veins are affected and how severe your condition is  You may need procedures to treat or remove your varicose veins  For example, your healthcare provider may inject a solution or use a laser to close the varicose veins  Surgery to remove long veins may also be done  Ask your healthcare provider for more information about procedures used to treat varicose veins  Manage varicose veins:   · Do not sit or stand for long periods of time  This can cause the blood to collect in your legs and make your symptoms worse  Bend or rotate your ankles several times every hour  Walk around for a few minutes every hour to get blood moving in your legs  · Do not cross your legs when you sit  This decreases blood flow to your feet and can make your symptoms worse  · Do not wear tight clothing or shoes  Do not wear high-heeled shoes  Do not wear clothes that are tight around the waist or knees  · Maintain a healthy weight  Being overweight or obese can make your varicose veins worse  Ask your healthcare provider how much you should weigh  Ask him or her to help you create a weight loss plan if you are overweight  · Wear pressure stockings as directed  The stockings are tight and put pressure on your legs  They improve blood flow and help prevent clots  · Elevate your legs  Keep them above the level of your heart for 15 to 30 minutes several times a day  You can also prop the end of your bed up slightly to elevate your legs while you sleep  This will help blood to flow back to your heart  · Get regular exercise  Talk to your healthcare provider about the best exercise plan for you  Exercise can improve blood flow to your legs and feet  Follow up with your healthcare provider as directed:  Write down your questions so you remember to ask them during your visits  © Copyright [address] Information is for End User's use only and may not be sold, redistributed or otherwise used for commercial purposes   All illustrations and images included in CareNotes® are the copyrighted property of A D A M , [employer]  or [employer]rik [employer]yes  The above information is an  only  It is not intended as medical advice for individual conditions or treatments  Talk to your doctor, nurse or pharmacist before following any medical regimen to see if it is safe and effective for you

## 2021-03-29 NOTE — ASSESSMENT & PLAN NOTE
73-year-old female with hypothyroid and longstanding bilateral extremity  Superficial varicosities who returns to the office for 2 month follow-up   -She was given prescription grade compression socks last visit though did not tolerate these  -She felt the stockings elevated her blood pressure and were too constrictive   -She tried over-the-counter compression with the same issues     -Currently minimal discomfort associated with veins   -Recommended periodic leg elevation and frequent elevation   -Use moisturizers to maintain skin integrity   -Follow up p r n   Notify the office if any worsening issues

## 2021-03-29 NOTE — PROGRESS NOTES
Assessment/Plan:    Symptomatic varicose veins of both lower extremities  27-year-old female with hypothyroid and longstanding bilateral extremity  Superficial varicosities who returns to the office for 2 month follow-up   -She was given prescription grade compression socks last visit though did not tolerate these  -She felt the stockings elevated her blood pressure and were too constrictive   -She tried over-the-counter compression with the same issues     -Currently minimal discomfort associated with veins   -Recommended periodic leg elevation and frequent elevation   -Use moisturizers to maintain skin integrity   -Follow up p r n  Notify the office if any worsening issues       Diagnoses and all orders for this visit:    Symptomatic varicose veins of both lower extremities    Other orders  -     Xiidra 5 % op solution; instill 1 drop into both eyes twice a day          Subjective:      Patient ID: Arnaldo Wesley is a 78 y o  female  HPI     Patient presents today for follow up on varicose veins  Pt is not wearing compression stockings  Pt states she is taking a supplement currently  Pt denies claudication  Pt is a non-smoker  The following portions of the patient's history were reviewed and updated as appropriate: allergies, current medications, past family history, past medical history, past social history, past surgical history and problem list   ROS reviewed     Review of Systems   Constitutional: Negative  HENT: Negative  Eyes: Negative  Respiratory: Negative  Cardiovascular: Negative  Gastrointestinal: Negative  Endocrine: Negative  Genitourinary: Negative  Musculoskeletal: Negative  Skin: Negative  Allergic/Immunologic: Negative  Neurological: Negative  Hematological: Negative  Psychiatric/Behavioral: Negative  Objective:    I have reviewed and made appropriate changes to the review of systems input by the medical assistant      Vitals:    03/29/21 1137 BP: 156/88   BP Location: Left arm   Patient Position: Sitting   Cuff Size: Standard   Pulse: 76   Weight: 62 8 kg (138 lb 6 4 oz)   Height: 5' 6 5" (1 689 m)       Patient Active Problem List   Diagnosis    Symptomatic varicose veins of both lower extremities       No past surgical history on file  Family History   Problem Relation Age of Onset    Heart attack Mother     No Known Problems Father        Social History     Socioeconomic History    Marital status:       Spouse name: Not on file    Number of children: Not on file    Years of education: Not on file    Highest education level: Not on file   Occupational History    Not on file   Social Needs    Financial resource strain: Not on file    Food insecurity     Worry: Not on file     Inability: Not on file    Transportation needs     Medical: Not on file     Non-medical: Not on file   Tobacco Use    Smoking status: Never Smoker    Smokeless tobacco: Never Used   Substance and Sexual Activity    Alcohol Use     Frequency: Never    Drug use: No    Sexual activity: Never   Lifestyle    Physical activity     Days per week: Not on file     Minutes per session: Not on file    Stress: Not on file   Relationships    Social connections     Talks on phone: Not on file     Gets together: Not on file     Attends [employer] service: Not on file     Active member of club or organization: Not on file     Attends meetings of clubs or organizations: Not on file     Relationship status: Not on file    Intimate partner violence     Fear of current or ex partner: Not on file     Emotionally abused: Not on file     Physically abused: Not on file     Forced sexual activity: Not on file   Other Topics Concern    Not on file   Social History Narrative    Not on file       Allergies   Allergen Reactions    Sulfur-Salicylic Acid [Salicylic Acid-Sulfur] Swelling         Current Outpatient Medications:     ARMOUR THYROID PO, Take 120 mg by mouth  , Disp: , Rfl:     Cholecalciferol (VITAMIN D3 PO), Take 25 mg by mouth, Disp: , Rfl:     Coenzyme Q10 (CO Q10) 100 MG CAPS, Take by mouth, Disp: , Rfl:     DHEA 10 MG TABS, Take 10 mg by mouth, Disp: , Rfl:     lisinopril (ZESTRIL) 5 mg tablet, , Disp: , Rfl: 0    Xiidra 5 % op solution, instill 1 drop into both eyes twice a day, Disp: , Rfl:     Zinc Sulfate (ZINC 15 PO), Take by mouth, Disp: , Rfl:     /88 (BP Location: Left arm, Patient Position: Sitting, Cuff Size: Standard)   Pulse 76   Ht 5' 6 5" (1 689 m)   Wt 62 8 kg (138 lb 6 4 oz)   BMI 22 00 kg/m²          Physical Exam  Vitals signs and nursing note reviewed  Constitutional:       Appearance: Normal appearance  HENT:      Head: Normocephalic and atraumatic  Eyes:      Extraocular Movements: Extraocular movements intact  Cardiovascular:      Pulses:           Dorsalis pedis pulses are 2+ on the right side and 2+ on the left side  Heart sounds: Normal heart sounds  Pulmonary:      Breath sounds: Normal breath sounds  Abdominal:      Palpations: Abdomen is soft  Musculoskeletal: Normal range of motion  General: Swelling present  Comments: Superficial reticular veins left lateral calf and right medial proximal calf truncal vein  BLE diffusely scattered spider veins    Skin:     General: Skin is warm and dry  Neurological:      General: No focal deficit present  Mental Status: She is alert and oriented to person, place, and time     Psychiatric:         Mood and Affect: Mood normal

## 2022-03-30 ENCOUNTER — HOSPITAL ENCOUNTER (OUTPATIENT)
Dept: RADIOLOGY | Facility: HOSPITAL | Age: 82
Discharge: HOME | End: 2022-03-30
Payer: COMMERCIAL

## 2022-03-30 DIAGNOSIS — M54.50 LOW BACK PAIN, UNSPECIFIED BACK PAIN LATERALITY, UNSPECIFIED CHRONICITY, UNSPECIFIED WHETHER SCIATICA PRESENT: ICD-10-CM

## 2022-03-30 PROCEDURE — 71046 X-RAY EXAM CHEST 2 VIEWS: CPT

## 2022-06-24 ENCOUNTER — TELEPHONE (OUTPATIENT)
Dept: CT IMAGING | Facility: HOSPITAL | Age: 82
End: 2022-06-24
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
## 2022-07-05 ENCOUNTER — HOSPITAL ENCOUNTER (OUTPATIENT)
Dept: CT IMAGING | Facility: HOSPITAL | Age: 82
Discharge: HOME | End: 2022-07-05
Payer: COMMERCIAL

## 2022-07-05 DIAGNOSIS — R91.1 SOLITARY LUNG NODULE: ICD-10-CM

## 2022-07-05 PROCEDURE — G1004 CDSM NDSC: HCPCS

## 2022-07-05 PROCEDURE — 71250 CT THORAX DX C-: CPT

## 2022-07-13 ENCOUNTER — TELEPHONE (OUTPATIENT)
Dept: PULMONOLOGY | Facility: CLINIC | Age: 82
End: 2022-07-13

## 2022-07-13 NOTE — TELEPHONE ENCOUNTER
Patient calling to make a NP appt at [facility] for a 9mm nodule   Please advise

## 2022-08-14 ENCOUNTER — HOSPITAL ENCOUNTER (INPATIENT)
Facility: HOSPITAL | Age: 82
LOS: 5 days | Discharge: NURSING, REHAB OR LONG-TERM CARE | DRG: 481 | End: 2022-08-19
Attending: EMERGENCY MEDICINE | Admitting: STUDENT IN AN ORGANIZED HEALTH CARE EDUCATION/TRAINING PROGRAM
Payer: COMMERCIAL

## 2022-08-14 ENCOUNTER — APPOINTMENT (EMERGENCY)
Dept: CT IMAGING | Facility: HOSPITAL | Age: 82
DRG: 481 | End: 2022-08-14
Payer: COMMERCIAL

## 2022-08-14 ENCOUNTER — APPOINTMENT (EMERGENCY)
Dept: RADIOLOGY | Facility: HOSPITAL | Age: 82
DRG: 481 | End: 2022-08-14
Payer: COMMERCIAL

## 2022-08-14 DIAGNOSIS — S72.141A CLOSED DISPLACED INTERTROCHANTERIC FRACTURE OF RIGHT FEMUR, INITIAL ENCOUNTER (HCC): ICD-10-CM

## 2022-08-14 DIAGNOSIS — S72.91XA FEMUR FRACTURE, RIGHT (HCC): ICD-10-CM
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
## 2022-08-14 LAB
ABO GROUP BLD: NORMAL
ABO GROUP BLD: NORMAL
ANION GAP SERPL CALCULATED.3IONS-SCNC: 10 MMOL/L (ref 4–13)
BASOPHILS # BLD AUTO: 0.02 THOUSANDS/ΜL (ref 0–0.1)
BASOPHILS NFR BLD AUTO: 0 % (ref 0–1)
BLD GP AB SCN SERPL QL: NEGATIVE
BUN SERPL-MCNC: 23 MG/DL (ref 5–25)
CALCIUM SERPL-MCNC: 9.2 MG/DL (ref 8.3–10.1)
CHLORIDE SERPL-SCNC: 104 MMOL/L (ref 96–108)
CO2 SERPL-SCNC: 31 MMOL/L (ref 21–32)
CREAT SERPL-MCNC: 1.08 MG/DL (ref 0.6–1.3)
EOSINOPHIL # BLD AUTO: 0.06 THOUSAND/ΜL (ref 0–0.61)
EOSINOPHIL NFR BLD AUTO: 1 % (ref 0–6)
ERYTHROCYTE [DISTWIDTH] IN BLOOD BY AUTOMATED COUNT: 14.6 % (ref 11.6–15.1)
GFR SERPL CREATININE-BSD FRML MDRD: 47 ML/MIN/1.73SQ M
GLUCOSE SERPL-MCNC: 142 MG/DL (ref 65–140)
HCT VFR BLD AUTO: 41.8 % (ref 34.8–46.1)
HGB BLD-MCNC: 13.3 G/DL (ref 11.5–15.4)
IMM GRANULOCYTES # BLD AUTO: 0.02 THOUSAND/UL (ref 0–0.2)
IMM GRANULOCYTES NFR BLD AUTO: 0 % (ref 0–2)
INR PPP: 0.91 (ref 0.84–1.19)
LYMPHOCYTES # BLD AUTO: 2.23 THOUSANDS/ΜL (ref 0.6–4.47)
LYMPHOCYTES NFR BLD AUTO: 37 % (ref 14–44)
MCH RBC QN AUTO: 29.9 PG (ref 26.8–34.3)
MCHC RBC AUTO-ENTMCNC: 31.8 G/DL (ref 31.4–37.4)
MCV RBC AUTO: 94 FL (ref 82–98)
MONOCYTES # BLD AUTO: 0.58 THOUSAND/ΜL (ref 0.17–1.22)
MONOCYTES NFR BLD AUTO: 10 % (ref 4–12)
NEUTROPHILS # BLD AUTO: 3.19 THOUSANDS/ΜL (ref 1.85–7.62)
NEUTS SEG NFR BLD AUTO: 52 % (ref 43–75)
NRBC BLD AUTO-RTO: 0 /100 WBCS
PLATELET # BLD AUTO: 161 THOUSANDS/UL (ref 149–390)
PMV BLD AUTO: 10.5 FL (ref 8.9–12.7)
POTASSIUM SERPL-SCNC: 3.8 MMOL/L (ref 3.5–5.3)
PROTHROMBIN TIME: 12.9 SECONDS (ref 11.6–14.5)
RBC # BLD AUTO: 4.45 MILLION/UL (ref 3.81–5.12)
RH BLD: NEGATIVE
RH BLD: NEGATIVE
SODIUM SERPL-SCNC: 145 MMOL/L (ref 135–147)
SPECIMEN EXPIRATION DATE: NORMAL
WBC # BLD AUTO: 6.1 THOUSAND/UL (ref 4.31–10.16)

## 2022-08-14 PROCEDURE — 85610 PROTHROMBIN TIME: CPT | Performed by: EMERGENCY MEDICINE

## 2022-08-14 PROCEDURE — 3E0234Z INTRODUCTION OF SERUM, TOXOID AND VACCINE INTO MUSCLE, PERCUTANEOUS APPROACH: ICD-10-PCS | Performed by: INTERNAL MEDICINE

## 2022-08-14 PROCEDURE — 86900 BLOOD TYPING SEROLOGIC ABO: CPT | Performed by: EMERGENCY MEDICINE

## 2022-08-14 PROCEDURE — 96374 THER/PROPH/DIAG INJ IV PUSH: CPT

## 2022-08-14 PROCEDURE — 90715 TDAP VACCINE 7 YRS/> IM: CPT | Performed by: EMERGENCY MEDICINE

## 2022-08-14 PROCEDURE — 85025 COMPLETE CBC W/AUTO DIFF WBC: CPT | Performed by: EMERGENCY MEDICINE

## 2022-08-14 PROCEDURE — 86850 RBC ANTIBODY SCREEN: CPT | Performed by: EMERGENCY MEDICINE

## 2022-08-14 PROCEDURE — 36415 COLL VENOUS BLD VENIPUNCTURE: CPT | Performed by: EMERGENCY MEDICINE

## 2022-08-14 PROCEDURE — 93005 ELECTROCARDIOGRAM TRACING: CPT

## 2022-08-14 PROCEDURE — 90471 IMMUNIZATION ADMIN: CPT

## 2022-08-14 PROCEDURE — G1004 CDSM NDSC: HCPCS

## 2022-08-14 PROCEDURE — 73502 X-RAY EXAM HIP UNI 2-3 VIEWS: CPT

## 2022-08-14 PROCEDURE — 99285 EMERGENCY DEPT VISIT HI MDM: CPT

## 2022-08-14 PROCEDURE — 70450 CT HEAD/BRAIN W/O DYE: CPT

## 2022-08-14 PROCEDURE — 86901 BLOOD TYPING SEROLOGIC RH(D): CPT | Performed by: EMERGENCY MEDICINE

## 2022-08-14 PROCEDURE — 73080 X-RAY EXAM OF ELBOW: CPT

## 2022-08-14 PROCEDURE — 80048 BASIC METABOLIC PNL TOTAL CA: CPT | Performed by: EMERGENCY MEDICINE

## 2022-08-14 PROCEDURE — 71045 X-RAY EXAM CHEST 1 VIEW: CPT

## 2022-08-14 PROCEDURE — 99285 EMERGENCY DEPT VISIT HI MDM: CPT | Performed by: EMERGENCY MEDICINE

## 2022-08-14 RX ORDER — OXYCODONE HYDROCHLORIDE 5 MG/1
2.5 TABLET ORAL EVERY 6 HOURS PRN
Status: DISCONTINUED | OUTPATIENT
Start: 2022-08-14 | End: 2022-08-19 | Stop reason: HOSPADM

## 2022-08-14 RX ORDER — HYDROMORPHONE HCL/PF 1 MG/ML
0.2 SYRINGE (ML) INJECTION EVERY 4 HOURS PRN
Status: DISCONTINUED | OUTPATIENT
Start: 2022-08-14 | End: 2022-08-19

## 2022-08-14 RX ORDER — OXYCODONE HYDROCHLORIDE 5 MG/1
5 TABLET ORAL EVERY 6 HOURS PRN
Status: DISCONTINUED | OUTPATIENT
Start: 2022-08-14 | End: 2022-08-19 | Stop reason: HOSPADM

## 2022-08-14 RX ORDER — FENTANYL CITRATE 50 UG/ML
75 INJECTION, SOLUTION INTRAMUSCULAR; INTRAVENOUS ONCE
Status: COMPLETED | OUTPATIENT
Start: 2022-08-14 | End: 2022-08-14

## 2022-08-14 RX ORDER — METHOCARBAMOL 500 MG/1
500 TABLET, FILM COATED ORAL EVERY 6 HOURS PRN
Status: DISCONTINUED | OUTPATIENT
Start: 2022-08-14 | End: 2022-08-19

## 2022-08-14 RX ORDER — HYDROMORPHONE HCL/PF 1 MG/ML
0.5 SYRINGE (ML) INJECTION EVERY 4 HOURS PRN
Status: DISCONTINUED | OUTPATIENT
Start: 2022-08-14 | End: 2022-08-14

## 2022-08-14 RX ORDER — FENTANYL CITRATE 50 UG/ML
1 INJECTION, SOLUTION INTRAMUSCULAR; INTRAVENOUS ONCE
Status: COMPLETED | OUTPATIENT
Start: 2022-08-14 | End: 2022-08-14

## 2022-08-14 RX ORDER — ACETAMINOPHEN 325 MG/1
975 TABLET ORAL EVERY 8 HOURS SCHEDULED
Status: DISCONTINUED | OUTPATIENT
Start: 2022-08-14 | End: 2022-08-19 | Stop reason: HOSPADM

## 2022-08-14 RX ADMIN — FENTANYL CITRATE 75 MCG: 50 INJECTION, SOLUTION INTRAMUSCULAR; INTRAVENOUS at 21:31

## 2022-08-14 RX ADMIN — ACETAMINOPHEN 325MG 975 MG: 325 TABLET ORAL at 22:22

## 2022-08-14 RX ADMIN — TETANUS TOXOID, REDUCED DIPHTHERIA TOXOID AND ACELLULAR PERTUSSIS VACCINE, ADSORBED 0.5 ML: 5; 2.5; 8; 8; 2.5 SUSPENSION INTRAMUSCULAR at 20:43

## 2022-08-15 ENCOUNTER — ANESTHESIA (INPATIENT)
Dept: PERIOP | Facility: HOSPITAL | Age: 82
DRG: 481 | End: 2022-08-15
Payer: COMMERCIAL

## 2022-08-15 ENCOUNTER — APPOINTMENT (INPATIENT)
Dept: RADIOLOGY | Facility: HOSPITAL | Age: 82
DRG: 481 | End: 2022-08-15
Payer: COMMERCIAL

## 2022-08-15 ENCOUNTER — ANESTHESIA EVENT (INPATIENT)
Dept: PERIOP | Facility: HOSPITAL | Age: 82
DRG: 481 | End: 2022-08-15
Payer: COMMERCIAL

## 2022-08-15 PROBLEM — E03.9 ACQUIRED HYPOTHYROIDISM: Status: ACTIVE | Noted: 2022-08-15

## 2022-08-15 PROBLEM — S72.91XA FEMUR FRACTURE, RIGHT (HCC): Status: ACTIVE | Noted: 2022-08-15

## 2022-08-15 PROBLEM — I10 PRIMARY HYPERTENSION: Status: ACTIVE | Noted: 2022-08-15

## 2022-08-15 LAB
ANION GAP SERPL CALCULATED.3IONS-SCNC: 7 MMOL/L (ref 4–13)
APTT PPP: 27 SECONDS (ref 23–37)
ATRIAL RATE: 87 BPM
BUN SERPL-MCNC: 20 MG/DL (ref 5–25)
CALCIUM SERPL-MCNC: 8.4 MG/DL (ref 8.3–10.1)
CHLORIDE SERPL-SCNC: 107 MMOL/L (ref 96–108)
CO2 SERPL-SCNC: 31 MMOL/L (ref 21–32)
CREAT SERPL-MCNC: 0.8 MG/DL (ref 0.6–1.3)
ERYTHROCYTE [DISTWIDTH] IN BLOOD BY AUTOMATED COUNT: 14.5 % (ref 11.6–15.1)
GFR SERPL CREATININE-BSD FRML MDRD: 68 ML/MIN/1.73SQ M
GLUCOSE SERPL-MCNC: 154 MG/DL (ref 65–140)
HCT VFR BLD AUTO: 35.8 % (ref 34.8–46.1)
HGB BLD-MCNC: 11.3 G/DL (ref 11.5–15.4)
INR PPP: 0.99 (ref 0.84–1.19)
MCH RBC QN AUTO: 29.7 PG (ref 26.8–34.3)
MCHC RBC AUTO-ENTMCNC: 31.6 G/DL (ref 31.4–37.4)
MCV RBC AUTO: 94 FL (ref 82–98)
P AXIS: 88 DEGREES
PLATELET # BLD AUTO: 132 THOUSANDS/UL (ref 149–390)
PMV BLD AUTO: 10.3 FL (ref 8.9–12.7)
POTASSIUM SERPL-SCNC: 4.4 MMOL/L (ref 3.5–5.3)
PR INTERVAL: 148 MS
PROTHROMBIN TIME: 13.8 SECONDS (ref 11.6–14.5)
QRS AXIS: 82 DEGREES
QRSD INTERVAL: 104 MS
QT INTERVAL: 402 MS
QTC INTERVAL: 483 MS
RBC # BLD AUTO: 3.8 MILLION/UL (ref 3.81–5.12)
SODIUM SERPL-SCNC: 145 MMOL/L (ref 135–147)
T WAVE AXIS: 82 DEGREES
TSH SERPL DL<=0.05 MIU/L-ACNC: 0.01 UIU/ML (ref 0.45–4.5)
VENTRICULAR RATE: 87 BPM
WBC # BLD AUTO: 7.11 THOUSAND/UL (ref 4.31–10.16)

## 2022-08-15 PROCEDURE — 99222 1ST HOSP IP/OBS MODERATE 55: CPT | Performed by: ORTHOPAEDIC SURGERY

## 2022-08-15 PROCEDURE — 0QS636Z REPOSITION RIGHT UPPER FEMUR WITH INTRAMEDULLARY INTERNAL FIXATION DEVICE, PERCUTANEOUS APPROACH: ICD-10-PCS | Performed by: ORTHOPAEDIC SURGERY

## 2022-08-15 PROCEDURE — 85730 THROMBOPLASTIN TIME PARTIAL: CPT | Performed by: PHYSICIAN ASSISTANT

## 2022-08-15 PROCEDURE — C1713 ANCHOR/SCREW BN/BN,TIS/BN: HCPCS | Performed by: ORTHOPAEDIC SURGERY

## 2022-08-15 PROCEDURE — 27245 TREAT THIGH FRACTURE: CPT | Performed by: ORTHOPAEDIC SURGERY

## 2022-08-15 PROCEDURE — 99222 1ST HOSP IP/OBS MODERATE 55: CPT | Performed by: STUDENT IN AN ORGANIZED HEALTH CARE EDUCATION/TRAINING PROGRAM

## 2022-08-15 PROCEDURE — 27245 TREAT THIGH FRACTURE: CPT | Performed by: PHYSICIAN ASSISTANT

## 2022-08-15 PROCEDURE — 93010 ELECTROCARDIOGRAM REPORT: CPT | Performed by: INTERNAL MEDICINE

## 2022-08-15 PROCEDURE — 84443 ASSAY THYROID STIM HORMONE: CPT | Performed by: STUDENT IN AN ORGANIZED HEALTH CARE EDUCATION/TRAINING PROGRAM

## 2022-08-15 PROCEDURE — C1769 GUIDE WIRE: HCPCS | Performed by: ORTHOPAEDIC SURGERY

## 2022-08-15 PROCEDURE — 85610 PROTHROMBIN TIME: CPT | Performed by: PHYSICIAN ASSISTANT

## 2022-08-15 PROCEDURE — 73552 X-RAY EXAM OF FEMUR 2/>: CPT

## 2022-08-15 PROCEDURE — 80048 BASIC METABOLIC PNL TOTAL CA: CPT | Performed by: PHYSICIAN ASSISTANT

## 2022-08-15 PROCEDURE — 85027 COMPLETE CBC AUTOMATED: CPT | Performed by: PHYSICIAN ASSISTANT

## 2022-08-15 DEVICE — TFNA FENESTRATED SCREW 100MM - STERILE
Type: IMPLANTABLE DEVICE | Status: FUNCTIONAL
Brand: TFN-ADVANCE

## 2022-08-15 DEVICE — 5.0MM TI LOCKING SCREW W/T25 STARDRIVE 40MM F/IM NAIL-STER: Type: IMPLANTABLE DEVICE | Site: HIP | Status: FUNCTIONAL

## 2022-08-15 DEVICE — 11MM/130 DEG TI CANN TFNA 380MM/RIGHT - STERILE
Type: IMPLANTABLE DEVICE | Site: HIP | Status: FUNCTIONAL
Brand: TFN-ADVANCE

## 2022-08-15 RX ORDER — FENTANYL CITRATE/PF 50 MCG/ML
25 SYRINGE (ML) INJECTION
Status: DISCONTINUED | OUTPATIENT
Start: 2022-08-15 | End: 2022-08-15 | Stop reason: HOSPADM

## 2022-08-15 RX ORDER — ENOXAPARIN SODIUM 100 MG/ML
40 INJECTION SUBCUTANEOUS DAILY
Status: DISCONTINUED | OUTPATIENT
Start: 2022-08-15 | End: 2022-08-19 | Stop reason: HOSPADM

## 2022-08-15 RX ORDER — LEVOTHYROXINE AND LIOTHYRONINE 19; 4.5 UG/1; UG/1
30 TABLET ORAL
Status: DISCONTINUED | OUTPATIENT
Start: 2022-08-15 | End: 2022-08-19 | Stop reason: HOSPADM

## 2022-08-15 RX ORDER — ONDANSETRON 2 MG/ML
4 INJECTION INTRAMUSCULAR; INTRAVENOUS ONCE AS NEEDED
Status: DISCONTINUED | OUTPATIENT
Start: 2022-08-15 | End: 2022-08-15 | Stop reason: HOSPADM

## 2022-08-15 RX ORDER — CEFAZOLIN SODIUM 1 G/50ML
1000 SOLUTION INTRAVENOUS EVERY 8 HOURS
Status: COMPLETED | OUTPATIENT
Start: 2022-08-15 | End: 2022-08-16

## 2022-08-15 RX ORDER — ONDANSETRON 2 MG/ML
4 INJECTION INTRAMUSCULAR; INTRAVENOUS EVERY 6 HOURS PRN
Status: DISCONTINUED | OUTPATIENT
Start: 2022-08-15 | End: 2022-08-19 | Stop reason: HOSPADM

## 2022-08-15 RX ORDER — LISINOPRIL 5 MG/1
5 TABLET ORAL ONCE
Status: DISCONTINUED | OUTPATIENT
Start: 2022-08-15 | End: 2022-08-19 | Stop reason: HOSPADM

## 2022-08-15 RX ORDER — LEVOTHYROXINE AND LIOTHYRONINE 9.5; 2.25 UG/1; UG/1
15 TABLET ORAL
Status: DISCONTINUED | OUTPATIENT
Start: 2022-08-15 | End: 2022-08-19 | Stop reason: HOSPADM

## 2022-08-15 RX ORDER — HYDROMORPHONE HCL IN WATER/PF 6 MG/30 ML
0.2 PATIENT CONTROLLED ANALGESIA SYRINGE INTRAVENOUS
Status: DISCONTINUED | OUTPATIENT
Start: 2022-08-15 | End: 2022-08-15 | Stop reason: HOSPADM

## 2022-08-15 RX ORDER — SODIUM CHLORIDE, SODIUM LACTATE, POTASSIUM CHLORIDE, CALCIUM CHLORIDE 600; 310; 30; 20 MG/100ML; MG/100ML; MG/100ML; MG/100ML
75 INJECTION, SOLUTION INTRAVENOUS CONTINUOUS
Status: DISCONTINUED | OUTPATIENT
Start: 2022-08-15 | End: 2022-08-16

## 2022-08-15 RX ORDER — SODIUM CHLORIDE, SODIUM LACTATE, POTASSIUM CHLORIDE, CALCIUM CHLORIDE 600; 310; 30; 20 MG/100ML; MG/100ML; MG/100ML; MG/100ML
INJECTION, SOLUTION INTRAVENOUS CONTINUOUS PRN
Status: DISCONTINUED | OUTPATIENT
Start: 2022-08-15 | End: 2022-08-15

## 2022-08-15 RX ORDER — LEVOTHYROXINE AND LIOTHYRONINE 76; 18 UG/1; UG/1
120 TABLET ORAL
Status: DISCONTINUED | OUTPATIENT
Start: 2022-08-15 | End: 2022-08-19 | Stop reason: HOSPADM

## 2022-08-15 RX ORDER — PROPOFOL 10 MG/ML
INJECTION, EMULSION INTRAVENOUS AS NEEDED
Status: DISCONTINUED | OUTPATIENT
Start: 2022-08-15 | End: 2022-08-15

## 2022-08-15 RX ORDER — ROCURONIUM BROMIDE 10 MG/ML
INJECTION, SOLUTION INTRAVENOUS AS NEEDED
Status: DISCONTINUED | OUTPATIENT
Start: 2022-08-15 | End: 2022-08-15

## 2022-08-15 RX ORDER — DEXTROSE, SODIUM CHLORIDE, AND POTASSIUM CHLORIDE 5; .45; .15 G/100ML; G/100ML; G/100ML
75 INJECTION INTRAVENOUS CONTINUOUS
Status: DISPENSED | OUTPATIENT
Start: 2022-08-15 | End: 2022-08-15

## 2022-08-15 RX ORDER — CEFAZOLIN SODIUM 1 G/50ML
1000 SOLUTION INTRAVENOUS ONCE
Status: DISCONTINUED | OUTPATIENT
Start: 2022-08-15 | End: 2022-08-15

## 2022-08-15 RX ORDER — FENTANYL CITRATE 50 UG/ML
INJECTION, SOLUTION INTRAMUSCULAR; INTRAVENOUS AS NEEDED
Status: DISCONTINUED | OUTPATIENT
Start: 2022-08-15 | End: 2022-08-15

## 2022-08-15 RX ORDER — ONDANSETRON 2 MG/ML
INJECTION INTRAMUSCULAR; INTRAVENOUS AS NEEDED
Status: DISCONTINUED | OUTPATIENT
Start: 2022-08-15 | End: 2022-08-15

## 2022-08-15 RX ORDER — DEXAMETHASONE SODIUM PHOSPHATE 10 MG/ML
INJECTION, SOLUTION INTRAMUSCULAR; INTRAVENOUS AS NEEDED
Status: DISCONTINUED | OUTPATIENT
Start: 2022-08-15 | End: 2022-08-15

## 2022-08-15 RX ORDER — SENNOSIDES 8.6 MG
1 TABLET ORAL
Status: DISCONTINUED | OUTPATIENT
Start: 2022-08-15 | End: 2022-08-19 | Stop reason: HOSPADM

## 2022-08-15 RX ORDER — LIDOCAINE HYDROCHLORIDE 10 MG/ML
INJECTION, SOLUTION EPIDURAL; INFILTRATION; INTRACAUDAL; PERINEURAL AS NEEDED
Status: DISCONTINUED | OUTPATIENT
Start: 2022-08-15 | End: 2022-08-15

## 2022-08-15 RX ORDER — CEFAZOLIN SODIUM 2 G/50ML
SOLUTION INTRAVENOUS AS NEEDED
Status: DISCONTINUED | OUTPATIENT
Start: 2022-08-15 | End: 2022-08-15

## 2022-08-15 RX ADMIN — OXYCODONE HYDROCHLORIDE 5 MG: 5 TABLET ORAL at 01:38

## 2022-08-15 RX ADMIN — ACETAMINOPHEN 325MG 975 MG: 325 TABLET ORAL at 15:04

## 2022-08-15 RX ADMIN — DEXAMETHASONE SODIUM PHOSPHATE 10 MG: 10 INJECTION, SOLUTION INTRAMUSCULAR; INTRAVENOUS at 12:19

## 2022-08-15 RX ADMIN — METHOCARBAMOL 500 MG: 500 TABLET ORAL at 21:13

## 2022-08-15 RX ADMIN — ACETAMINOPHEN 325MG 975 MG: 325 TABLET ORAL at 05:31

## 2022-08-15 RX ADMIN — ONDANSETRON 4 MG: 2 INJECTION INTRAMUSCULAR; INTRAVENOUS at 12:25

## 2022-08-15 RX ADMIN — SODIUM CHLORIDE, SODIUM LACTATE, POTASSIUM CHLORIDE, AND CALCIUM CHLORIDE: .6; .31; .03; .02 INJECTION, SOLUTION INTRAVENOUS at 10:55

## 2022-08-15 RX ADMIN — LIDOCAINE HYDROCHLORIDE 50 MG: 10 INJECTION, SOLUTION EPIDURAL; INFILTRATION; INTRACAUDAL; PERINEURAL at 11:11

## 2022-08-15 RX ADMIN — CEFAZOLIN SODIUM 2000 MG: 2 SOLUTION INTRAVENOUS at 11:15

## 2022-08-15 RX ADMIN — ACETAMINOPHEN 325MG 975 MG: 325 TABLET ORAL at 21:12

## 2022-08-15 RX ADMIN — CEFAZOLIN SODIUM 1000 MG: 1 SOLUTION INTRAVENOUS at 20:59

## 2022-08-15 RX ADMIN — OXYCODONE HYDROCHLORIDE 5 MG: 5 TABLET ORAL at 08:08

## 2022-08-15 RX ADMIN — LEVOTHYROXINE, LIOTHYRONINE 120 MG: 76; 18 TABLET ORAL at 05:31

## 2022-08-15 RX ADMIN — METHOCARBAMOL 500 MG: 500 TABLET ORAL at 01:37

## 2022-08-15 RX ADMIN — SUGAMMADEX 200 MG: 100 INJECTION, SOLUTION INTRAVENOUS at 12:30

## 2022-08-15 RX ADMIN — ROCURONIUM BROMIDE 40 MG: 10 INJECTION, SOLUTION INTRAVENOUS at 11:13

## 2022-08-15 RX ADMIN — PROPOFOL 130 MG: 10 INJECTION, EMULSION INTRAVENOUS at 11:13

## 2022-08-15 RX ADMIN — ENOXAPARIN SODIUM 40 MG: 100 INJECTION SUBCUTANEOUS at 15:04

## 2022-08-15 RX ADMIN — LEVOTHYROXINE, LIOTHYRONINE 15 MG: 9.5; 2.25 TABLET ORAL at 05:32

## 2022-08-15 RX ADMIN — LEVOTHYROXINE, LIOTHYRONINE 30 MG: 19; 4.5 TABLET ORAL at 05:31

## 2022-08-15 RX ADMIN — DEXTROSE, SODIUM CHLORIDE, AND POTASSIUM CHLORIDE 75 ML/HR: 5; .45; .15 INJECTION INTRAVENOUS at 04:34

## 2022-08-15 RX ADMIN — FENTANYL CITRATE 25 MCG: 50 INJECTION, SOLUTION INTRAMUSCULAR; INTRAVENOUS at 11:13

## 2022-08-15 RX ADMIN — SODIUM CHLORIDE, SODIUM LACTATE, POTASSIUM CHLORIDE, AND CALCIUM CHLORIDE 75 ML/HR: .6; .31; .03; .02 INJECTION, SOLUTION INTRAVENOUS at 15:05

## 2022-08-15 RX ADMIN — FENTANYL CITRATE 25 MCG: 50 INJECTION, SOLUTION INTRAMUSCULAR; INTRAVENOUS at 11:55

## 2022-08-15 RX ADMIN — ROCURONIUM BROMIDE 10 MG: 10 INJECTION, SOLUTION INTRAVENOUS at 11:44

## 2022-08-15 NOTE — CASE MANAGEMENT
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 2021      LOS (days): 1  Geometric Mean LOS (GMLOS) (days):   Days to GMLOS:     OBJECTIVE:    Risk of Unplanned Readmission Score: 9 99         Current admission status: Inpatient  Referral Reason: Acute Rehab, VNA    Preferred Pharmacy:   [address] [place] [address] [id number] [place] [address],[address] [place] [id number]  Phone: [phone] Fax: [phone]    Primary Care Provider: Conrado Pena DO    Primary Insurance: [facility]  Secondary Insurance:     ASSESSMENT:  [address] [place] [address] Representative - Child   Primary Phone: [phone] (Home)               Advance Directives  Does patient have a [address]?: No  Was patient offered paperwork?: Yes (declined)  Does patient have Advance Directives?: Yes  Advance Directives: Living will  Primary Contact: [facility]         Readmission Root Cause  30 Day Readmission: No    Patient Information  Admitted from[de-identified] Home  Mental Status: Alert  During Assessment patient was accompanied by: Not accompanied during assessment  Assessment information provided by[de-identified] Patient  Primary Caregiver: Self  Support Systems: Family members  [place] of Residence:  [facility] do you live in?: [address] [facility] entry access options   Select all that apply : Stairs  Number of steps to enter home : 4  Do the steps have railings?: Yes  Type of Current Residence: [facility]  In the last 12 months, was there a time when you were not able to pay the mortgage or rent on time?: No  In the last 12 months, how many places have you lived?: 1  In the last 12 months, was there a time when you did not have a steady place to sleep or slept in a shelter (including now)?: No  Homeless/housing insecurity resource given?: N/A  Living Arrangements: Lives Alone  Is patient a ?: No    Activities of Daily Living Prior to Admission  Functional Status: Independent  Completes ADLs independently?: Yes  Ambulates independently?: Yes  Does patient use assisted devices?: No  Does patient currently own DME?: No  Does patient have a history of Outpatient Therapy (PT/OT)?: Yes  Does the patient have a history of Short-Term Rehab?: No  Does patient have a history of HHC?: No  Does patient currently have [facility]?: No         Patient Information Continued  Income Source: Pension/skilled nursing  Does patient have prescription coverage?: Yes  Within the past 12 months, you worried that your food would run out before you got the money to buy more : Never true  Within the past 12 months, the food you bought just didn't last and you didn't have money to get more : Never true  Food insecurity resource given?: N/A  Does patient receive dialysis treatments?: No  Does patient have a history of substance abuse?: No  Does patient have a history of Mental Health Diagnosis?: No         Means of Transportation  Means of Transport to Appts[de-identified] Drives Self  In the past 12 months, has lack of transportation kept you from medical appointments or from getting medications?: No  In the past 12 months, has lack of transportation kept you from meetings, work, or from getting things needed for daily living?: No  Was application for public transport provided?: N/A        DISCHARGE DETAILS:    Discharge planning discussed with[de-identified] Patient  Freedom of Choice: Yes  Comments - Freedom of Choice: discussed - Pt aware PT/OT will eval after surgery for dc needs  CM contacted family/caregiver?: No- see comments (pt alert and indpt in room)  Were Treatment Team discharge recommendations reviewed with patient/caregiver?: Yes  Did patient/caregiver verbalize understanding of patient care needs?: Yes       Contacts  Reason/Outcome: Discharge Alena Guzman         Is the patient interested in [facility] at discharge?: No (pending recommendations)    DME Referral Provided  Referral made for DME?: No (pending needs post op)    Other Referral/Resources/Interventions Provided:  Interventions: HHC, Short Term Rehab  Referral Comments: Pt will be evaluated by PT/OT after surgery   Recommdendations to be made at that time  Discharge Destination Plan[de-identified] Home with [address] [facility], Short Term Rehab  Transport at Discharge : Family, Wheelchair Mau Richards, [address] [facility]                             IMM Given (Date):: 08/15/22  IMM Given to[de-identified] Patient     Additional Comments: Cm met with pt at bedside  Pt states that she lives in a trailer home with 4 broderick and a railing  Pt reports that she is very indpt at home  She has no DME or ambualtory need PTA  Pt has no hx of HHC/STR/DA/MH  She has participated in OP PT/OT before  Pt states that she drives  Shes uses [employer] and sees PCP at [employer]  One of pts dtrs will transport home pending needs  Cm and pt discussed that PT/OT will see pt once she is has surgery and ortho has cleared for PT/OT consult  Cm explained that the recommendation could be HHC or STR  Pending how well she does  Pt expressed understanding   Cm following for dc needs

## 2022-08-15 NOTE — OP NOTE
OPERATIVE REPORT  PATIENT NAME: Tae Lopez    :  1940  MRN: [id number]  Pt Location:  OR ROOM 02    SURGERY DATE: 8/15/2022    Surgeon(s) and Role:     * Yanna Taylor MD - Primary     * Homero Senior PA-C - Assisting    Preop Diagnosis:  Closed displaced intertrochanteric fracture of right femur, initial encounter ([place] [address] ) Sade Herrera    Post-Op Diagnosis Codes:     * Closed displaced intertrochanteric fracture of right femur, initial encounter ([place] [address] ) [S72 141A]    Procedure(s) (LRB):  INSERTION NAIL IM FEMUR ANTEGRADE (TROCHANTERIC) (Right)    Specimen(s):  * No specimens in log *    Estimated Blood Loss:   Minimal    Drains:  External Urinary Catheter (Active)   Collection Container Canister and suction tubing (For Female) 08/15/22 0745   Suction Pressure (mmHg) 80 mmHg 08/15/22 0745   Interventions Removed and skin assessed; Pericare performed;Device changed;Suction canister changed;Suction tubing changed 08/15/22 0745   Number of days: 1     Anesthesia Type:   General    Operative Indications:  Closed displaced intertrochanteric fracture of right femur, initial encounter ([place] [address] ) [S72 141A]    Hardware:  Synthes TFNA 11 mm x 380 mm, Dynamic screw 100 mm, 1 distal locking screw    Indication:  Tae Lopez is a 78y o  years old female fell and injured her right hip  Patient was diagnosed with right hip intertrochanteric fracture, displaced  Surgical treatment was recommended  Risks and complications were discussed with patient and family  A consent was signed  Procedure and Technique:  Patient was brought into the OR anesthestized and intubated with LMA without any difficulty  Patient was placed on fracture table and secured  Patient's feet were placed into a foot tavera  Closed reduction was done with help of C-arm  The right hip fracture appeared to anatomically reduced both AP and Lateral views  The right hip was prepped and draped in a sterile fashion   A time out is called and identified the right hip as the operative site  2 cm incision was made proximal to the greater trochanter  Dissection was carried down to the fascia ron which was divided sharply  A K-wire was inserted into the tip of the greater trochanter and into the proximal femur  Image is used to confirm the location of the guide pin both AP and lateral which appeared to be in good position  The guide pin is over reamed  A long standard 11 mm x 380 mm TFNA nail was then inserted into the femoral canal  A radiolouncent guide was use to assess the depth of the nail  A guide pin was then inserted into the femoral head through the lateral proximal femur  Care was made sure the guide pin is in the center-center position  The dynamic screw measured to be 100 mm length, which was inserted without any issue  A locking screw was inserted proximally  Distal femoral locking screw also inserted manually without difficulty through a small stab wound distally  The nail  guide was removed and final images were taken  The fracture appeared to be well aligned and hardware was in good position  The incisions were closed with staples  Sterile bulky dressing was applied  Patient was extubated and transfer to recovery room  Family was contacted  There was no qualified resident available to assist     Mrs Morocho Antonio was required in the OR for assist in fracture reduction, stabilization with hardware, and closure of the incisions       Complications:   None    Patient Disposition:  PACU     SIGNATURE: Gardenia Booker MD  DATE: August 15, 2022  TIME: 12:32 PM

## 2022-08-15 NOTE — H&P
[facility]  H&P- Tara Bocanegra 1940, 78 y o  female MRN: [id number]  Unit/Bed#: ED 10 Encounter: [id number]  Primary Care Provider: Stefano Felty, DO   Date and time admitted to hospital: 8/14/2022  8:30 PM    * Femur fracture, right ([address] )  Assessment & Plan  · S/p mechanical fall on to right hip  · XR with right femur fracture that is displace  · No head strike, LOC, or other injury   · NV intact on admission - continue NV checks   · Not on any a/c   · Ortho consult  · NPO with IVF  · Pain control with tylenol, oxycodone, robaxin, and dilaudid   · PT, OT, and CM post surgery     Primary hypertension  Assessment & Plan  · Home regimen: lisinopril 5mg BID  · Give one dose now to HTN but hold AM dose to prevent intraoperative hypotension     Acquired hypothyroidism  Assessment & Plan  · Home regimen: armour thryoid 165mg daily   · Continue with pt home supply     VTE Pharmacologic Prophylaxis: VTE Score: 8 High Risk (Score >/= 5) - Pharmacological DVT Prophylaxis Ordered: lovenox ordered for after surgery   Sequential Compression Devices Ordered  Code Status: Level 1 - Full Code   Discussion with family: Patient declined call to   states pt aware    Anticipated Length of Stay: Patient will be admitted on an inpatient basis with an anticipated length of stay of greater than 2 midnights secondary to right femur fracture, HTN, hypothyroidism  Total Time for Visit, including Counseling / Coordination of Care: 45 minutes Greater than 50% of this total time spent on direct patient counseling and coordination of care  Chief Complaint: "I fell on to my hip while watering my lawn"    History of Present Illness:  Tara Bocanegra is a 78 y o  female with a PMH of HTN and hypothyroidism who presents with right hip pain s/p mechanical fall just PTA to the ED   Pt reports she was watering her lawn when the hose became stuck on something and when she pulled on it she lost her balance falling on to her right hip  She experienced immediate pain and was unable to bear weight on it  No head strike or LOC  No routine use of ASA or anticoagulation  No numbness or weakness to the right leg  Reports her neighbor helped her up and gave her an ASA for the pain PTA  She also reports a small abrasion to her elbow but no significant associated pain  She has otherwise been in her normal states of health and is following with pulmonology closely for a lung nodule found recently  No dyspnea or cough  Review of Systems:  Review of Systems   Constitutional: Negative for chills and fever  HENT: Negative for congestion  Respiratory: Negative for cough and shortness of breath  Cardiovascular: Negative for chest pain and leg swelling  Gastrointestinal: Negative for abdominal pain, constipation, diarrhea, nausea and vomiting  Musculoskeletal: Positive for arthralgias (right hip) and gait problem  Neurological: Negative for weakness and numbness  Psychiatric/Behavioral: Negative for confusion  All other systems reviewed and are negative  Past Medical and Surgical History:   Past Medical History:   Diagnosis Date    Arthritis     Disease of thyroid gland     Hypertension        History reviewed  No pertinent surgical history  Meds/Allergies:  Prior to Admission medications    Medication Sig Start Date End Date Taking?  Authorizing Provider   MERY THYROID PO Take 165 mg by mouth   Yes Historical Provider, MD   Cholecalciferol (VITAMIN D3 PO) Take 25 mg by mouth   Yes Historical Provider, MD   Coenzyme Q10 (CO Q10) 100 MG CAPS Take by mouth   Yes Historical Provider, MD   DHEA 10 MG TABS Take 10 mg by mouth   Yes Historical Provider, MD   lisinopril (ZESTRIL) 5 mg tablet Take 5 mg by mouth 2 (two) times a day 1/23/19  Yes Historical Provider, MD   Zinc Sulfate (ZINC 15 PO) Take by mouth   Yes Historical Provider, MD   Xiidra 5 % op solution instill 1 drop into both eyes twice a day 2/16/21 8/15/22 Yes Historical Provider, MD     I have reviewed home medications with patient personally  Allergies: Allergies   Allergen Reactions    Sulfur-Salicylic Acid [Salicylic Acid-Sulfur] Swelling       Social History:  Marital Status:    Occupation: retired   Patient Pre-hospital Living Situation: Home, Alone  Patient Pre-hospital Level of Mobility: walks  Patient Pre-hospital Diet Restrictions: none   Substance Use History:   Social History     Substance and Sexual Activity   Alcohol Use None     Social History     Tobacco Use   Smoking Status Never Smoker   Smokeless Tobacco Never Used     Social History     Substance and Sexual Activity   Drug Use No       Family History:  Family History   Problem Relation Age of Onset    Heart attack Mother     No Known Problems Father        Physical Exam:     Vitals:   Blood Pressure: (!) 175/81 (08/15/22 0100)  Pulse: 90 (08/15/22 0100)  Temperature: 97 9 °F (36 6 °C) (08/14/22 2040)  Temp Source: Oral (08/14/22 2040)  Respirations: 21 (08/15/22 0100)  Weight - Scale: 66 6 kg (146 lb 13 2 oz) (08/14/22 2037)  SpO2: 98 % (08/15/22 0100)    Physical Exam  Vitals and nursing note reviewed  Constitutional:       General: She is not in acute distress  Appearance: Normal appearance  She is not ill-appearing  Comments: Pleasant and conversational     HENT:      Head: Normocephalic  Nose: Nose normal       Mouth/Throat:      Mouth: Mucous membranes are moist    Eyes:      Extraocular Movements: Extraocular movements intact  Conjunctiva/sclera: Conjunctivae normal    Cardiovascular:      Rate and Rhythm: Normal rate  Pulmonary:      Effort: Pulmonary effort is normal    Abdominal:      General: Abdomen is flat  There is no distension  Palpations: Abdomen is soft  Tenderness: There is no abdominal tenderness  Musculoskeletal:      Cervical back: Normal range of motion  Comments: 1+ pitting edema to b/l LEs   RLE is shortened and externally rotated  DP and PT pulses are 2+  Capillary refill <2 seconds  Unable to flex hip  Sensation intact  Able to wiggle toes and plantar and dorsiflex foot  TTP on the right lateral thigh  No ecchymosis  Skin:     General: Skin is warm and dry  Coloration: Skin is not pale  Neurological:      General: No focal deficit present  Mental Status: She is alert  Comments: Oriented to self, place, month, year, and president  Psychiatric:         Mood and Affect: Mood normal          Thought Content: Thought content normal           Additional Data:     Lab Results:  Results from last 7 days   Lab Units 08/14/22  2043   WBC Thousand/uL 6 10   HEMOGLOBIN g/dL 13 3   HEMATOCRIT % 41 8   PLATELETS Thousands/uL 161   NEUTROS PCT % 52   LYMPHS PCT % 37   MONOS PCT % 10   EOS PCT % 1     Results from last 7 days   Lab Units 08/14/22  2043   SODIUM mmol/L 145   POTASSIUM mmol/L 3 8   CHLORIDE mmol/L 104   CO2 mmol/L 31   BUN mg/dL 23   CREATININE mg/dL 1 08   ANION GAP mmol/L 10   CALCIUM mg/dL 9 2   GLUCOSE RANDOM mg/dL 142*     Results from last 7 days   Lab Units 08/14/22  2043   INR  0 91                   Imaging: Reviewed radiology reports from this admission including: CT head and Personally reviewed the following imaging: xray(s)  CT head wo contrast   Final Result by Fortino Carrero DO (08/14 2151)      No acute intracranial abnormality  Workstation performed: [id number]         XR hip/pelv 2-3 vws right if performed    (Results Pending)   XR elbow 3+ vw right    (Results Pending)   XR chest portable    (Results Pending)       EKG and Other Studies Reviewed on Admission:   · EKG: NSR  HR 87 bpm  Slightly prolonged QT at 483 ms  no acute ischemia       ** Please Note: This note has been constructed using a voice recognition system   **

## 2022-08-15 NOTE — UTILIZATION REVIEW
Initial Clinical Review    Admission: Date/Time/Statement:   Admission Orders (From admission, onward)     Ordered        08/14/22 2139  INPATIENT ADMISSION  Once                      Orders Placed This Encounter   Procedures    INPATIENT ADMISSION     Standing Status:   Standing     Number of Occurrences:   1     Order Specific Question:   Level of Care     Answer:   Med Surg [16]     Order Specific Question:   Estimated length of stay     Answer:   More than 2 Midnights     Order Specific Question:   Certification     Answer:   I certify that inpatient services are medically necessary for this patient for a duration of greater than two midnights  See H&P and MD Progress Notes for additional information about the patient's course of treatment  ED Arrival Information     Expected   -    Arrival   8/14/2022 20:30    Acuity   Emergent            Means of arrival   Ambulance    Escorted by   [facility]    Service   Hospitalist    Admission type   Emergency            Arrival complaint   Foot injury           Chief Complaint   Patient presents with   Beth Carne    Hip Pain       Initial Presentation: 78 y o  female with PMH of HTN, hypothyroidism presents to [address] ED via EMS with c/o right hip s/p mechanical fall, unable to bear weight  IV Fentanyl given PTA  Imaging revealed right displaced intertrochanteric fracture, see full report below  In ED, Fentanyl, robaxin and oxycodone given  Admit Inpatient med surg, ortho consult, NPO, IVF, pain control, PT/OT eval, CM post sx, give home meds  Date: 8/15   Day 2: Ortho Consult  Right leg shortened and externally rotated, swelling noted    Keep NPO, recommend surgical intervention, hold chemical dvt ppx, continue pain control, NWB RLE       8/15 OP Note:  Procedure(s) (LRB):  INSERTION NAIL IM FEMUR ANTEGRADE (TROCHANTERIC) (Right)  Anesthesia Type:   General  Operative Indications:  Closed displaced intertrochanteric fracture of right femur, initial encounter [facility]) Amalia Silverman     ED Triage Vitals   Temperature Pulse Respirations Blood Pressure SpO2   08/14/22 2040 08/14/22 2037 08/14/22 2037 08/14/22 2037 08/14/22 2032   97 9 °F (36 6 °C) 94 16 (!) 193/85 95 %      Temp Source Heart Rate Source Patient Position - Orthostatic VS BP Location FiO2 (%)   08/14/22 2037 08/14/22 2037 08/14/22 2037 -- --   Oral Monitor Sitting        Pain Score       08/14/22 2131       9          Wt Readings from Last 1 Encounters:   08/14/22 66 6 kg (146 lb 13 2 oz)     Additional Vital Signs:   Date/Time Temp Pulse Resp BP MAP (mmHg) SpO2 Calculated FIO2 (%) - Nasal Cannula Nasal Cannula O2 Flow Rate (L/min) O2 Device   08/15/22 14:51:37 97 8 °F (36 6 °C) 96 16 147/81 103 99 % -- -- --   08/15/22 14:06:09 97 7 °F (36 5 °C) 89 15 147/82 104 85 % Abnormal  -- -- --   08/15/22 1325 99 5 °F (37 5 °C) 78 15 182/80 Abnormal  115 98 % 28 2 L/min Nasal cannula   08/15/22 1315 -- 87 20 182/81 Abnormal  116 93 % 28 2 L/min Nasal cannula   08/15/22 1300 -- 80 12 176/79 Abnormal  116 100 % -- -- None (Room air)   08/15/22 1250 98 4 °F (36 9 °C) 82 12 170/77 -- 100 % 44 6 L/min Simple mask   08/15/22 1051 97 3 °F (36 3 °C) Abnormal  91 18 174/79 Abnormal  -- 92 % -- -- None (Room air)   08/15/22 08:15:12 98 4 °F (36 9 °C) 94 17 148/85 106 95 % -- -- --   08/15/22 0745 -- -- -- -- -- 95 % 22 0 5 L/min Nasal cannula   08/15/22 04:23:06 98 4 °F (36 9 °C) 90 -- 136/89 105 92 % -- -- --   08/15/22 0230 -- 78 16 155/70 -- 97 % -- -- None (Room air)   08/15/22 0200 -- 77 16 159/72 -- 96 % -- -- None (Room air)   08/15/22 0130 -- 81 19 163/77 111 97 % -- -- None (Room air)   08/15/22 0100 -- 90 21 175/81 Abnormal  -- 98 % -- -- None (Room air)   08/15/22 0030 -- 81 20 161/74 -- 97 % -- -- None (Room air)   08/15/22 0000 -- 79 21 150/70 -- 98 % -- -- None (Room air)   08/14/22 2330 -- 81 21 154/64 -- 98 % -- -- None (Room air)   08/14/22 2300 -- 86 20 163/72 -- 97 % -- -- None (Room air)   08/14/22 2230 -- 83 20 166/70 -- 97 % -- -- None (Room air)   08/14/22 2200 -- 85 21 162/72 -- 95 % -- -- None (Room air)   08/14/22 2130 -- 84 21 170/75 -- 95 % -- -- None (Room air)   08/14/22 2105 -- 91 22 -- -- 90 % -- -- None (Room air)   08/14/22 2040 97 9 °F (36 6 °C) -- -- -- -- -- -- -- --   08/14/22 20:37:20 -- 94 16 193/85 Abnormal  -- 91 % -- -- None (Room air)   08/14/22 20:32:09 -- -- -- -- -- 95 % -- -- --     Pertinent Labs/Diagnostic Test Results:   8/14 EKG:  Normal sinus rhythm  Incomplete right bundle branch block  Nonspecific ST abnormality  Prolonged QT  Abnormal ECG    XR femur 2 vw right   Final Result by Mello Calvert MD (08/15 5465)      Fluoroscopic guidance provided for procedure guidance  Please refer to the separate procedure notes for additional details  XR elbow 3+ vw right   Final Result by Dimitri Montemayor MD (08/15 8601)      No acute osseous abnormality  CT head wo contrast   Final Result by Myesha Kee DO (08/14 0661)      No acute intracranial abnormality  XR hip/pelv 2-3 vws right if performed   Final Result by Dionicio Kimbrough MD (08/15 0427)      Comminuted right intratrochanteric fracture  XR chest portable   Final Result by Dimitri Montemayor MD (76/82 0019)      No acute cardiopulmonary disease  Stable nodule in the right midlung  Please see CT report of 07/05/2022           Results from last 7 days   Lab Units 08/15/22  0527 08/14/22 2043   WBC Thousand/uL 7 11 6 10   HEMOGLOBIN g/dL 11 3* 13 3   HEMATOCRIT % 35 8 41 8   PLATELETS Thousands/uL 132* 161   NEUTROS ABS Thousands/µL  --  3 19     Results from last 7 days   Lab Units 08/15/22  0527 08/14/22 2043   SODIUM mmol/L 145 145   POTASSIUM mmol/L 4 4 3 8   CHLORIDE mmol/L 107 104   CO2 mmol/L 31 31   ANION GAP mmol/L 7 10   BUN mg/dL 20 23   CREATININE mg/dL 0 80 1 08   EGFR ml/min/1 73sq m 68 47   CALCIUM mg/dL 8 4 9 2     Results from last 7 days   Lab Units 08/15/22  0527 08/14/22  2043   GLUCOSE RANDOM mg/dL 154* 142*     Results from last 7 days   Lab Units 08/15/22  0527 08/14/22  2043   PROTIME seconds 13 8 12 9   INR  0 99 0 91   PTT seconds 27  --      Results from last 7 days   Lab Units 08/15/22  0527   TSH 3RD GENERATON uIU/mL 0 010*       ED Treatment:   Medication Administration from 08/14/2022 2030 to 08/15/2022 0414       Date/Time Order Dose Route Action     08/14/2022 2043 tetanus-diphtheria-acellular pertussis (BOOSTRIX) IM injection 0 5 mL 0 5 mL Intramuscular Given     08/14/2022 2114 fentanyl citrate (PF) (FOR EMS ONLY) 100 mcg/2 mL injection 100 mcg 0 mcg Does not apply Given to EMS     08/14/2022 2131 fentanyl citrate (PF) 100 MCG/2ML 75 mcg 75 mcg Intravenous Given     08/14/2022 2222 acetaminophen (TYLENOL) tablet 975 mg 975 mg Oral Given     08/15/2022 0137 methocarbamol (ROBAXIN) tablet 500 mg 500 mg Oral Given     08/15/2022 0138 oxyCODONE (ROXICODONE) IR tablet 2 5 mg   Oral See Alternative     08/15/2022 0138 oxyCODONE (ROXICODONE) IR tablet 5 mg 5 mg Oral Given        Past Medical History:   Diagnosis Date    Arthritis     Disease of thyroid gland     Hypertension      Present on Admission:  **None**      Admitting Diagnosis: Hip pain [M25 559]  Femur fracture, right (HCC) [S72 91XA]  Age/Sex: 78 y o  female  Admission Orders:  Scheduled Medications:  acetaminophen, 975 mg, Oral, Q8H DACIA  cefazolin, 1,000 mg, Intravenous, Q8H  enoxaparin, 40 mg, Subcutaneous, Daily  lisinopril, 5 mg, Oral, Once  thyroid, 15 mg, Oral, Early Morning   And  thyroid, 120 mg, Oral, Early Morning   And  thyroid desiccated, 30 mg, Oral, Early Morning      Continuous IV Infusions:  dextrose 5 % and sodium chloride 0 45 % with KCl 20 mEq/L, 75 mL/hr, Intravenous, Continuous  lactated ringers, 75 mL/hr, Intravenous, Continuous      PRN Meds:  HYDROmorphone, 0 2 mg, Intravenous, Q4H PRN  methocarbamol, 500 mg, Oral, Q6H PRN x1 thus far  ondansetron, 4 mg, Intravenous, Q6H PRN  oxyCODONE, 2 5 mg, Oral, Q6H PRN Or  oxyCODONE, 5 mg, Oral, Q6H PRN x2 thus far  senna, 1 tablet, Oral, HS PRN    scd  Neurovascular checks    IP CONSULT TO ORTHOPEDIC SURGERY  IP CONSULT TO CASE MANAGEMENT    Network Utilization Review Department  ATTENTION: Please call with any questions or concerns to [phone] and carefully listen to the prompts so that you are directed to the right person  All voicemails are confidential   Chelo Espino all requests for admission clinical reviews, approved or denied determinations and any other requests to dedicated fax number below belonging to the campus where the patient is receiving treatment   List of dedicated fax numbers for the Facilities:  [address] DENIALS (Administrative/Medical Necessity) [phone]   [address] (Maternity/NICU/Pediatrics) [phone]   [address]  [address] [address] [place] [address] [address] [facility] [place] [address] [phone]

## 2022-08-15 NOTE — PLAN OF CARE
Problem: Potential for Falls  Goal: Patient will remain free of falls  Description: INTERVENTIONS:  - Educate patient/family on patient safety including physical limitations  - Instruct patient to call for assistance with activity   - Consult OT/PT to assist with strengthening/mobility   - Keep Call bell within reach  - Keep bed low and locked with side rails adjusted as appropriate  - Keep care items and personal belongings within reach  - Initiate and maintain comfort rounds  - Make Fall Risk Sign visible to staff  - Offer Toileting every 2 Hours, in advance of need  - Obtain necessary fall risk management equipment: nonskid footwear  - Apply yellow socks and bracelet for high fall risk patients  - Consider moving patient to room near nurses station  Outcome: Progressing     Problem: PAIN - ADULT  Goal: Verbalizes/displays adequate comfort level or baseline comfort level  Description: Interventions:  - Encourage patient to monitor pain and request assistance  - Assess pain using appropriate pain scale  - Administer analgesics based on type and severity of pain and evaluate response  - Implement non-pharmacological measures as appropriate and evaluate response  - Consider cultural and social influences on pain and pain management  - Notify physician/advanced practitioner if interventions unsuccessful or patient reports new pain  Outcome: Progressing     Problem: SAFETY ADULT  Goal: Maintain or return to baseline ADL function  Description: INTERVENTIONS:  -  Assess patient's ability to carry out ADLs; assess patient's baseline for ADL function and identify physical deficits which impact ability to perform ADLs (bathing, care of mouth/teeth, toileting, grooming, dressing, etc )  - Assess/evaluate cause of self-care deficits   - Assess range of motion  - Assess patient's mobility; develop plan if impaired  - Assess patient's need for assistive devices and provide as appropriate  - Encourage maximum independence but intervene and supervise when necessary  - Involve family in performance of ADLs  - Assess for home care needs following discharge   - Consider OT consult to assist with ADL evaluation and planning for discharge  - Provide patient education as appropriate  Outcome: Progressing     Problem: DISCHARGE PLANNING  Goal: Discharge to home or other facility with appropriate resources  Description: INTERVENTIONS:  - Identify barriers to discharge w/patient and caregiver  - Arrange for needed discharge resources and transportation as appropriate  - Identify discharge learning needs (meds, wound care, etc )  - Arrange for interpretive services to assist at discharge as needed  - Refer to Case Management Department for coordinating discharge planning if the patient needs post-hospital services based on physician/advanced practitioner order or complex needs related to functional status, cognitive ability, or social support system  Outcome: Progressing     Problem: Knowledge Deficit  Goal: Patient/family/caregiver demonstrates understanding of disease process, treatment plan, medications, and discharge instructions  Description: Complete learning assessment and assess knowledge base    Interventions:  - Provide teaching at level of understanding  - Provide teaching via preferred learning methods  Outcome: Progressing     Problem: METABOLIC, FLUID AND ELECTROLYTES - ADULT  Goal: Electrolytes maintained within normal limits  Description: INTERVENTIONS:  - Monitor labs and assess patient for signs and symptoms of electrolyte imbalances  - Administer electrolyte replacement as ordered  - Monitor response to electrolyte replacements, including repeat lab results as appropriate  - Instruct patient on fluid and nutrition as appropriate  Outcome: Progressing  Goal: Fluid balance maintained  Description: INTERVENTIONS:  - Monitor labs   - Monitor I/O and WT  - Instruct patient on fluid and nutrition as appropriate  - Assess for signs & symptoms of volume excess or deficit  Outcome: Progressing     Problem: SKIN/TISSUE INTEGRITY - ADULT  Goal: Incision(s), wounds(s) or drain site(s) healing without S/S of infection  Description: INTERVENTIONS  - Assess and document dressing, incision, wound bed, drain sites and surrounding tissue  - Provide patient and family education  - Perform skin care/dressing changes every   Outcome: Progressing     Problem: MUSCULOSKELETAL - ADULT  Goal: Maintain or return mobility to safest level of function  Description: INTERVENTIONS:  - Assess patient's ability to carry out ADLs; assess patient's baseline for ADL function and identify physical deficits which impact ability to perform ADLs (bathing, care of mouth/teeth, toileting, grooming, dressing, etc )  - Assess/evaluate cause of self-care deficits   - Assess range of motion  - Assess patient's mobility  - Assess patient's need for assistive devices and provide as appropriate  - Encourage maximum independence but intervene and supervise when necessary  - Involve family in performance of ADLs  - Assess for home care needs following discharge   - Consider OT consult to assist with ADL evaluation and planning for discharge  - Provide patient education as appropriate  Outcome: Progressing

## 2022-08-15 NOTE — DISCHARGE INSTRUCTIONS
Discharge Instructions - Orthopedics  Kiarra Ledbetter 78 y o  female MRN: [id number]  Unit/Bed#: UB OR MAIN    Weight Bearing Status:                                           WBAT to RLE with assistance    DVT prophylaxis   mg BID x 6 weeks    Pain:  Continue analgesics as directed    Dressing Instructions:   Please keep clean, dry and intact for one week  Then may remove and replace with gauze/tape or bandaids  Can get incision wet once first dressing is changed  Appt Instructions: If you do not have your appointment, please call the clinic at [phone] to schedule follow up with Dr Chandrika Hawkins in 10-14 days  Otherwise followup as scheduled     Contact the office sooner if you experience any increased numbness/tingling in the extremities  Miscellaneous:   Ice to right hip

## 2022-08-15 NOTE — ED PROVIDER NOTES
History  Chief Complaint   Patient presents with    Fall    Hip Pain     78-year-old female history of hypertension, arthritis, presenting due to fall and hip pain  States she was walking outside when she tripped over a hose and fell on her right hip  Has had severe pain in her right hip and has not been able to bear weight or lift her leg  Says she has a friend who was a nurse who gave her a dose of aspirin after the fall  She denies any head strike at the time or loss of consciousness  Says she scraped her elbow but denies any other injury and denies any pain at her elbow  Denies taking any other medication pre-hospital and denies being on any blood thinning medication or routine use of aspirin  Prior to Admission Medications   Prescriptions Last Dose Informant Patient Reported? Taking? ARMOUR THYROID PO 8/14/2022 at Unknown time Self Yes Yes   Sig: Take 165 mg by mouth   Cholecalciferol (VITAMIN D3 PO) 8/14/2022 at Unknown time Self Yes Yes   Sig: Take 25 mg by mouth   Coenzyme Q10 (CO Q10) 100 MG CAPS 8/14/2022 at Unknown time Self Yes Yes   Sig: Take by mouth   DHEA 10 MG TABS 8/14/2022 at Unknown time Self Yes Yes   Sig: Take 10 mg by mouth   Zinc Sulfate (ZINC 15 PO) 8/14/2022 at Unknown time Self Yes Yes   Sig: Take by mouth   lisinopril (ZESTRIL) 5 mg tablet 8/14/2022 at Unknown time Self Yes Yes   Sig: Take 5 mg by mouth 2 (two) times a day      Facility-Administered Medications: None       Past Medical History:   Diagnosis Date    Arthritis     Disease of thyroid gland     Hypertension        Past Surgical History:   Procedure Laterality Date    AK OPEN RX FEMUR FX+INTRAMED VIKI Right 8/15/2022    Procedure: INSERTION NAIL IM FEMUR ANTEGRADE (TROCHANTERIC);   Surgeon: Jessica Starks MD;  Location:  MAIN OR;  Service: Orthopedics       Family History   Problem Relation Age of Onset    Heart attack Mother     No Known Problems Father      I have reviewed and agree with the history as documented  E-Cigarette/Vaping    E-Cigarette Use Never User      E-Cigarette/Vaping Substances    Nicotine No     THC No     CBD No     Flavoring No     Other No     Unknown No      Social History     Tobacco Use    Smoking status: Never Smoker    Smokeless tobacco: Never Used   Vaping Use    Vaping Use: Never used   Substance Use Topics    Alcohol use: Not Currently    Drug use: No       Review of Systems   Constitutional: Negative for activity change, appetite change, chills, fatigue and fever  HENT: Negative for congestion and rhinorrhea  Eyes: Negative for visual disturbance  Respiratory: Negative for cough, chest tightness, shortness of breath and wheezing  Cardiovascular: Negative for chest pain, palpitations and leg swelling  Gastrointestinal: Negative for abdominal pain, diarrhea, nausea and vomiting  Genitourinary: Negative for flank pain  Musculoskeletal: Negative for arthralgias and myalgias  Right hip pain   Skin: Positive for wound  Negative for rash  Neurological: Negative for syncope, weakness and headaches  All other systems reviewed and are negative  Physical Exam  Physical Exam  Vitals and nursing note reviewed  Constitutional:       General: She is not in acute distress  Appearance: She is well-developed  HENT:      Head: Normocephalic and atraumatic  Eyes:      Conjunctiva/sclera: Conjunctivae normal    Cardiovascular:      Rate and Rhythm: Normal rate and regular rhythm  Heart sounds: No murmur heard  Pulmonary:      Effort: Pulmonary effort is normal  No respiratory distress  Breath sounds: Normal breath sounds  Abdominal:      Palpations: Abdomen is soft  Tenderness: There is no abdominal tenderness  Musculoskeletal:      Cervical back: Neck supple  Comments: Right hip deformity, rotated and shortened   Skin:     General: Skin is warm and dry        Comments: Abrasion to right elbow   Neurological:      Mental Status: She is alert and oriented to person, place, and time     Psychiatric:         Mood and Affect: Mood normal          Vital Signs  ED Triage Vitals   Temperature Pulse Respirations Blood Pressure SpO2   08/14/22 2040 08/14/22 2037 08/14/22 2037 08/14/22 2037 08/14/22 2032   97 9 °F (36 6 °C) 94 16 (!) 193/85 95 %      Temp Source Heart Rate Source Patient Position - Orthostatic VS BP Location FiO2 (%)   08/14/22 2037 08/14/22 2037 08/14/22 2037 -- --   Oral Monitor Sitting        Pain Score       08/14/22 2131       9           Vitals:    08/16/22 0720 08/16/22 1039 08/16/22 1044 08/16/22 1513   BP: 125/62 134/65 134/62 141/70   Pulse: 91 96 89 96   Patient Position - Orthostatic VS:             Visual Acuity  Visual Acuity    Flowsheet Row Most Recent Value   L Pupil Size (mm) 3   R Pupil Size (mm) 3          ED Medications  Medications   acetaminophen (TYLENOL) tablet 975 mg (975 mg Oral Given 8/16/22 2017)   methocarbamol (ROBAXIN) tablet 500 mg (500 mg Oral Given 8/15/22 2113)   oxyCODONE (ROXICODONE) IR tablet 2 5 mg ( Oral See Alternative 8/16/22 0949)     Or   oxyCODONE (ROXICODONE) IR tablet 5 mg (5 mg Oral Given 8/16/22 0949)   HYDROmorphone (DILAUDID) injection 0 2 mg (has no administration in time range)   lisinopril (ZESTRIL) tablet 5 mg (5 mg Oral Not Given 8/15/22 0234)   dextrose 5 % and sodium chloride 0 45 % with KCl 20 mEq/L infusion (0 mL/hr Intravenous Paused 8/15/22 0900)   senna (SENOKOT) tablet 8 6 mg (has no administration in time range)   ondansetron (ZOFRAN) injection 4 mg (has no administration in time range)   enoxaparin (LOVENOX) subcutaneous injection 40 mg (40 mg Subcutaneous Given 8/16/22 0940)   thyroid (ARMOUR) tablet 15 mg (15 mg Oral Given 8/16/22 0503)     And   thyroid (ARMOUR) tablet 120 mg (120 mg Oral Given 8/16/22 0503)     And   thyroid desiccated (ARMOUR) tablet 30 mg (30 mg Oral Given 8/16/22 0503)   ferrous sulfate tablet 325 mg (has no administration in time range) ascorbic acid (VITAMIN C) tablet 500 mg (500 mg Oral Given 8/16/22 1443)   tetanus-diphtheria-acellular pertussis (BOOSTRIX) IM injection 0 5 mL (0 5 mL Intramuscular Given 8/14/22 2043)   fentanyl citrate (PF) (FOR EMS ONLY) 100 mcg/2 mL injection 100 mcg (0 mcg Does not apply Given to EMS 8/14/22 2114)   fentanyl citrate (PF) 100 MCG/2ML 75 mcg (75 mcg Intravenous Given 8/14/22 2131)   ceFAZolin (ANCEF) IVPB (premix in dextrose) 1,000 mg 50 mL (1,000 mg Intravenous New Bag 8/16/22 0246)       Diagnostic Studies  Results Reviewed     Procedure Component Value Units Date/Time    Basic metabolic panel [[id number]]  (Abnormal) Collected: 08/15/22 0527    Lab Status: Final result Specimen: Blood from Arm, Right Updated: 08/15/22 0622     Sodium 145 mmol/L      Potassium 4 4 mmol/L      Chloride 107 mmol/L      CO2 31 mmol/L      ANION GAP 7 mmol/L      BUN 20 mg/dL      Creatinine 0 80 mg/dL      Glucose 154 mg/dL      Calcium 8 4 mg/dL      eGFR 68 ml/min/1 73sq m     Narrative:      [place] guidelines for Chronic Kidney Disease (CKD):     Stage 1 with normal or high GFR (GFR > 90 mL/min/1 73 square meters)    Stage 2 Mild CKD (GFR = 60-89 mL/min/1 73 square meters)    Stage 3A Moderate CKD (GFR = 45-59 mL/min/1 73 square meters)    Stage 3B Moderate CKD (GFR = 30-44 mL/min/1 73 square meters)    Stage 4 Severe CKD (GFR = 15-29 mL/min/1 73 square meters)    Stage 5 End Stage CKD (GFR <15 mL/min/1 73 square meters)  Note: GFR calculation is accurate only with a steady state creatinine    CBC (With Platelets) [[id number]]  (Abnormal) Collected: 08/15/22 0527    Lab Status: Final result Specimen: Blood from Arm, Right Updated: 08/15/22 0558     WBC 7 11 Thousand/uL      RBC 3 80 Million/uL      Hemoglobin 11 3 g/dL      Hematocrit 35 8 %      MCV 94 fL      MCH 29 7 pg      MCHC 31 6 g/dL      RDW 14 5 %      Platelets 095 Thousands/uL      MPV 10 3 fL     Protime-INR [[id number]] (Normal) Collected: 08/15/22 0527    Lab Status: Final result Specimen: Blood from Arm, Right Updated: 08/15/22 0553     Protime 13 8 seconds      INR 0 99    APTT [[id number]]  (Normal) Collected: 08/15/22 0527    Lab Status: Final result Specimen: Blood from Arm, Right Updated: 08/15/22 0553     PTT 27 seconds     Basic metabolic panel [[id number]]  (Abnormal) Collected: 08/14/22 2043    Lab Status: Final result Specimen: Blood from Arm, Left Updated: 08/14/22 2109     Sodium 145 mmol/L      Potassium 3 8 mmol/L      Chloride 104 mmol/L      CO2 31 mmol/L      ANION GAP 10 mmol/L      BUN 23 mg/dL      Creatinine 1 08 mg/dL      Glucose 142 mg/dL      Calcium 9 2 mg/dL      eGFR 47 ml/min/1 73sq m     Narrative:      [place] guidelines for Chronic Kidney Disease (CKD):     Stage 1 with normal or high GFR (GFR > 90 mL/min/1 73 square meters)    Stage 2 Mild CKD (GFR = 60-89 mL/min/1 73 square meters)    Stage 3A Moderate CKD (GFR = 45-59 mL/min/1 73 square meters)    Stage 3B Moderate CKD (GFR = 30-44 mL/min/1 73 square meters)    Stage 4 Severe CKD (GFR = 15-29 mL/min/1 73 square meters)    Stage 5 End Stage CKD (GFR <15 mL/min/1 73 square meters)  Note: GFR calculation is accurate only with a steady state creatinine    Protime-INR [[id number]]  (Normal) Collected: 08/14/22 2043    Lab Status: Final result Specimen: Blood from Arm, Left Updated: 08/14/22 2108     Protime 12 9 seconds      INR 0 91    CBC and differential [[id number]] Collected: 08/14/22 2043    Lab Status: Final result Specimen: Blood from Arm, Left Updated: 08/14/22 2055     WBC 6 10 Thousand/uL      RBC 4 45 Million/uL      Hemoglobin 13 3 g/dL      Hematocrit 41 8 %      MCV 94 fL      MCH 29 9 pg      MCHC 31 8 g/dL      RDW 14 6 %      MPV 10 5 fL      Platelets 091 Thousands/uL      nRBC 0 /100 WBCs      Neutrophils Relative 52 %      Immat GRANS % 0 %      Lymphocytes Relative 37 %      Monocytes Relative 10 % Eosinophils Relative 1 %      Basophils Relative 0 %      Neutrophils Absolute 3 19 Thousands/µL      Immature Grans Absolute 0 02 Thousand/uL      Lymphocytes Absolute 2 23 Thousands/µL      Monocytes Absolute 0 58 Thousand/µL      Eosinophils Absolute 0 06 Thousand/µL      Basophils Absolute 0 02 Thousands/µL                  XR femur 2 vw right   Final Result by Naya Robles MD (08/15 6409)      Fluoroscopic guidance provided for procedure guidance  Please refer to the separate procedure notes for additional details  Workstation performed: [id number]         XR elbow 3+ vw right   Final Result by Ashlyn Dominguez MD (87/58 3787)      No acute osseous abnormality  Workstation performed: [id number]         CT head wo contrast   Final Result by Rico Mcguire DO (08/14 9871)      No acute intracranial abnormality  Workstation performed: [id number]         XR hip/pelv 2-3 vws right if performed   Final Result by James Hernandez MD (08/15 6674)      Comminuted right intratrochanteric fracture  Workstation performed: [id number]         XR chest portable   Final Result by Ashlyn Dominguez MD (34/84 5838)      No acute cardiopulmonary disease  Stable nodule in the right midlung  Please see CT report of 07/05/2022  Workstation performed: [id number]                    Procedures  Procedures         ED Course                                             MDM  Number of Diagnoses or Management Options  Fall, initial encounter  Femur fracture, right ([address] )  Diagnosis management comments: Comminuted intertrochanteric fx noted  Will admit to medicine w ortho consult w tentative plans for OR tomorrow         Disposition  Final diagnoses:   Fall, initial encounter   Femur fracture, right ([address] )     Time reflects when diagnosis was documented in both MDM as applicable and the Disposition within this note     Time User Action Codes Description Comment 8/14/2022  9:38 PM Aisha Mancera Add [D66  VCZI] Fall, initial encounter     8/14/2022  9:38 PM Aisha Mancera Add [S72 91XA] Femur fracture, right ([address] )     8/15/2022  8:13 AM Serafin Kwonghold Add [S72 141A] Closed displaced intertrochanteric fracture of right femur, initial encounter [facility])       ED Disposition     ED Disposition   Admit    Condition   Stable    Date/Time   Sun Aug 14, 2022  9:38 PM    Comment   Case was discussed with DEBRA and the patient's admission status was agreed to be Admission Status: inpatient status to the service of Dr Og Boyer   Follow-up Information     Follow up With Specialties Details Why Contact Info    Lana Garcia MD Orthopedic Surgery Follow up in 2 week(s)  Kate Crespo [address]            Current Discharge Medication List      CONTINUE these medications which have NOT CHANGED    Details   ARMOUR THYROID PO Take 165 mg by mouth      Cholecalciferol (VITAMIN D3 PO) Take 25 mg by mouth      Coenzyme Q10 (CO Q10) 100 MG CAPS Take by mouth      DHEA 10 MG TABS Take 10 mg by mouth      lisinopril (ZESTRIL) 5 mg tablet Take 5 mg by mouth 2 (two) times a day  Refills: 0      Zinc Sulfate (ZINC 15 PO) Take by mouth             No discharge procedures on file      PDMP Review     None          ED Provider  Electronically Signed by           Timbo Pro DO  08/16/22 8561

## 2022-08-15 NOTE — QUICK NOTE
responded to call bell  Pt's daughter wanted to ask if she could wake up her mom to ask her a question but as we were talking, pt woke up

## 2022-08-15 NOTE — PHYSICAL THERAPY NOTE
Physical Therapy Cancellation Note         08/15/22 0709   PT Last Visit   PT Visit Date 08/15/22   Note Type   Note type Cancelled Session   Cancel Reasons Medical status; PT order received, chart reviewed; R femur fracture  Pending ortho consult and PT order for post op evaluation;  will follow up post op as schedule allows and pt appropriate       Amber East, PT

## 2022-08-15 NOTE — ASSESSMENT & PLAN NOTE
· S/p mechanical fall on to right hip  · XR with right femur fracture that is displace  · No head strike, LOC, or other injury   · NV intact on admission - continue NV checks   · Not on any a/c   · Ortho consult  · NPO with IVF  · Pain control with tylenol, oxycodone, robaxin, and dilaudid   · PT, OT, and CM post surgery

## 2022-08-15 NOTE — ASSESSMENT & PLAN NOTE
· Home regimen: lisinopril 5mg BID  · Give one dose now to HTN but hold AM dose to prevent intraoperative hypotension

## 2022-08-15 NOTE — ASSESSMENT & PLAN NOTE
· S/p mechanical fall on to right hip  · S/P right long TFNA  · XR with right femur fracture that is displace  · No head strike, LOC, or other injury   · NV intact on admission - continue NV checks   · Orthopedic on board  · Pain control with tylenol, oxycodone, robaxin, and dilaudid   · PT, OT, and CM post surgery   · Trend H&H  · Encourage incentive spirometer use  · Wound care and dressing changes per Orthopedics

## 2022-08-15 NOTE — ANESTHESIA PREPROCEDURE EVALUATION
Procedure:  INSERTION NAIL IM FEMUR ANTEGRADE (TROCHANTERIC) (Right Hip)    Relevant Problems   CARDIO   (+) Primary hypertension      ENDO   (+) Acquired hypothyroidism     Hemoglobin    11 3 Low            LEFT VENTRICLE:  Systolic function was normal by visual assessment  Ejection fraction was estimated to be 60 %  There were no regional wall motion abnormalities  Wall thickness was mildly increased  Features were consistent with a pseudonormal left ventricular filling pattern, with concomitant abnormal relaxation and increased filling pressure (grade 2 diastolic dysfunction)      MITRAL VALVE:  There was mild to moderate annular calcification  There was mild regurgitation      AORTIC VALVE:  The valve was trileaflet  Leaflets exhibited normal thickness, moderate calcification, and normal cuspal separation  Transaortic velocity was increased due to valvular stenosis  There was mild stenosis  There was mild regurgitation          Physical Exam    Airway    Mallampati score: II  TM Distance: >3 FB  Neck ROM: full     Dental       Cardiovascular      Pulmonary      Other Findings        Anesthesia Plan  ASA Score- 2     Anesthesia Type- general with ASA Monitors  Additional Monitors:   Airway Plan: ETT  Comment: Propofol gtt          Plan Factors-    Chart reviewed  Induction- intravenous  Postoperative Plan-     Informed Consent- Anesthetic plan and risks discussed with patient  I personally reviewed this patient with the CRNA  Discussed and agreed on the Anesthesia Plan with the CRNA  Dara Soulier

## 2022-08-15 NOTE — CONSULTS
Consultation - Orthopedics   Mani Cochran 78 y o  female MRN: [id number]  Unit/Bed#: -01 Encounter: [id number]      Assessment/Plan     Assessment:  Right hip displaced intertrochanteric fracture    Plan:  Admitted to medical service  Recommend right hip trochanteric femoral nailing by Dr Floresita Palencia today  Risks, benefits and complications of surgery discussed in detail by Dr Floresita Palencia and informed consent was obtained  NPO  Hold chemical DVT prophylaxis  Pain control as needed  Bed rest, nonweightbearing to right lower extremity    History of Present Illness   Physician Requesting Consult: Rashawn Jane MD  Reason for Consult / Principal Problem: right hip pain  HPI: Mani Cochran is a 78y o  year old female who is a community ambulator presents with right hip pain after suffering a mechanical fall outside her home yesterday  Patient was watering her garden and when she pulled on the whole she lost her balance falling on her right side  She had immediate right hip pain  Her neighbor found her and called EMS  She was brought to the emergency room and x-rays revealed a right hip intertrochanteric fracture  She was admitted to the medical service and Orthopedics was consulted  No prior injury to the right hip  She was not on any blood thinners prior to admission  She currently lives alone, but states 2 of her daughters live close to her  Inpatient consult to Orthopedic Surgery  Consult performed by: Yesenia Belcher PA-C  Consult ordered by: Gaby Hernadez PA-C          Review of Systems   Constitutional: Negative  HENT: Negative  Eyes: Negative  Respiratory: Negative  Cardiovascular: Negative  Gastrointestinal: Negative  Endocrine: Negative  Genitourinary: Negative  Musculoskeletal: Positive for arthralgias, gait problem and joint swelling  Skin: Negative  Allergic/Immunologic: Negative  Hematological: Negative  Psychiatric/Behavioral: Negative          Historical Information   Past Medical History:   Diagnosis Date    Arthritis     Disease of thyroid gland     Hypertension      History reviewed  No pertinent surgical history  Social History   Social History     Substance and Sexual Activity   Alcohol Use Not Currently     Social History     Substance and Sexual Activity   Drug Use No     E-Cigarette/Vaping    E-Cigarette Use Never User      E-Cigarette/Vaping Substances    Nicotine No     THC No     CBD No     Flavoring No     Other No     Unknown No      Social History     Tobacco Use   Smoking Status Never Smoker   Smokeless Tobacco Never Used     Family History: non-contributory    Meds/Allergies   all current active meds have been reviewed  Allergies   Allergen Reactions    Sulfur-Salicylic Acid [Salicylic Acid-Sulfur] Swelling       Objective   Vitals: Blood pressure 136/89, pulse 90, temperature 98 4 °F (36 9 °C), resp  rate 16, weight 66 6 kg (146 lb 13 2 oz), SpO2 95 %, not currently breastfeeding  ,Body mass index is 23 34 kg/m²  Intake/Output Summary (Last 24 hours) at 8/15/2022 0806  Last data filed at 8/14/2022 2032  Gross per 24 hour   Intake 500 ml   Output --   Net 500 ml     I/O last 24 hours: In: 500 [I V :500]  Out: -     Invasive Devices  Report    Peripheral Intravenous Line  Duration           Peripheral IV 08/14/22 Left Antecubital <1 day          Drain  Duration           External Urinary Catheter <1 day                Physical Exam  Constitutional:       General: She is not in acute distress  Appearance: She is well-developed  HENT:      Head: Normocephalic and atraumatic  Eyes:      Conjunctiva/sclera: Conjunctivae normal       Pupils: Pupils are equal, round, and reactive to light  Cardiovascular:      Rate and Rhythm: Normal rate and regular rhythm  Heart sounds: Normal heart sounds  No murmur heard  Pulmonary:      Effort: Pulmonary effort is normal  No respiratory distress        Breath sounds: Normal breath sounds  Abdominal:      General: Bowel sounds are normal       Palpations: Abdomen is soft  Tenderness: There is no abdominal tenderness  Musculoskeletal:         General: Swelling, tenderness and signs of injury present  Cervical back: Normal range of motion  Skin:     General: Skin is warm and dry  Neurological:      Mental Status: She is alert and oriented to person, place, and time  Psychiatric:         Mood and Affect: Mood normal        Right Hip Exam     Tenderness   The patient is experiencing tenderness in the lateral     Other   Erythema: absent  Scars: absent  Sensation: normal  Pulse: present    Comments:  Swelling of thigh, but compressible  No open wounds  Right lower extremity shortened and externally rotated  Actively moving ankle and toes              Lab Results: I have personally reviewed pertinent lab results    Imaging Studies: I have personally reviewed pertinent films in PACS  X-ray right hip: displaced intertrochanteric fracture of femur

## 2022-08-15 NOTE — PLAN OF CARE
Problem: Potential for Falls  Goal: Patient will remain free of falls  Description: INTERVENTIONS:  - Educate patient/family on patient safety including physical limitations  - Instruct patient to call for assistance with activity   - Consult OT/PT to assist with strengthening/mobility   - Keep Call bell within reach  - Keep bed low and locked with side rails adjusted as appropriate  - Keep care items and personal belongings within reach  - Initiate and maintain comfort rounds  - Make Fall Risk Sign visible to staff  - Offer Toileting every 2 Hours, in advance of need  - Obtain necessary fall risk management equipment: nonskid footwear  - Apply yellow socks and bracelet for high fall risk patients  - Consider moving patient to room near nurses station  Outcome: Progressing     Problem: PAIN - ADULT  Goal: Verbalizes/displays adequate comfort level or baseline comfort level  Description: Interventions:  - Encourage patient to monitor pain and request assistance  - Assess pain using appropriate pain scale  - Administer analgesics based on type and severity of pain and evaluate response  - Implement non-pharmacological measures as appropriate and evaluate response  - Consider cultural and social influences on pain and pain management  - Notify physician/advanced practitioner if interventions unsuccessful or patient reports new pain  Outcome: Progressing     Problem: SAFETY ADULT  Goal: Maintain or return to baseline ADL function  Description: INTERVENTIONS:  -  Assess patient's ability to carry out ADLs; assess patient's baseline for ADL function and identify physical deficits which impact ability to perform ADLs (bathing, care of mouth/teeth, toileting, grooming, dressing, etc )  - Assess/evaluate cause of self-care deficits   - Assess range of motion  - Assess patient's mobility; develop plan if impaired  - Assess patient's need for assistive devices and provide as appropriate  - Encourage maximum independence but intervene and supervise when necessary  - Involve family in performance of ADLs  - Assess for home care needs following discharge   - Consider OT consult to assist with ADL evaluation and planning for discharge  - Provide patient education as appropriate  Outcome: Progressing     Problem: DISCHARGE PLANNING  Goal: Discharge to home or other facility with appropriate resources  Description: INTERVENTIONS:  - Identify barriers to discharge w/patient and caregiver  - Arrange for needed discharge resources and transportation as appropriate  - Identify discharge learning needs (meds, wound care, etc )  - Arrange for interpretive services to assist at discharge as needed  - Refer to Case Management Department for coordinating discharge planning if the patient needs post-hospital services based on physician/advanced practitioner order or complex needs related to functional status, cognitive ability, or social support system  Outcome: Progressing     Problem: Knowledge Deficit  Goal: Patient/family/caregiver demonstrates understanding of disease process, treatment plan, medications, and discharge instructions  Description: Complete learning assessment and assess knowledge base    Interventions:  - Provide teaching at level of understanding  - Provide teaching via preferred learning methods  Outcome: Progressing     Problem: METABOLIC, FLUID AND ELECTROLYTES - ADULT  Goal: Electrolytes maintained within normal limits  Description: INTERVENTIONS:  - Monitor labs and assess patient for signs and symptoms of electrolyte imbalances  - Administer electrolyte replacement as ordered  - Monitor response to electrolyte replacements, including repeat lab results as appropriate  - Instruct patient on fluid and nutrition as appropriate  Outcome: Progressing  Goal: Fluid balance maintained  Description: INTERVENTIONS:  - Monitor labs   - Monitor I/O and WT  - Instruct patient on fluid and nutrition as appropriate  - Assess for signs & symptoms of volume excess or deficit  Outcome: Progressing     Problem: SKIN/TISSUE INTEGRITY - ADULT  Goal: Incision(s), wounds(s) or drain site(s) healing without S/S of infection  Description: INTERVENTIONS  - Assess and document dressing, incision, wound bed, drain sites and surrounding tissue  - Provide patient and family education  - Perform skin care/dressing changes every   Outcome: Progressing     Problem: MUSCULOSKELETAL - ADULT  Goal: Maintain or return mobility to safest level of function  Description: INTERVENTIONS:  - Assess patient's ability to carry out ADLs; assess patient's baseline for ADL function and identify physical deficits which impact ability to perform ADLs (bathing, care of mouth/teeth, toileting, grooming, dressing, etc )  - Assess/evaluate cause of self-care deficits   - Assess range of motion  - Assess patient's mobility  - Assess patient's need for assistive devices and provide as appropriate  - Encourage maximum independence but intervene and supervise when necessary  - Involve family in performance of ADLs  - Assess for home care needs following discharge   - Consider OT consult to assist with ADL evaluation and planning for discharge  - Provide patient education as appropriate  Outcome: Progressing     Problem: MOBILITY - ADULT  Goal: Maintain or return to baseline ADL function  Description: INTERVENTIONS:  -  Assess patient's ability to carry out ADLs; assess patient's baseline for ADL function and identify physical deficits which impact ability to perform ADLs (bathing, care of mouth/teeth, toileting, grooming, dressing, etc )  - Assess/evaluate cause of self-care deficits   - Assess range of motion  - Assess patient's mobility; develop plan if impaired  - Assess patient's need for assistive devices and provide as appropriate  - Encourage maximum independence but intervene and supervise when necessary  - Involve family in performance of ADLs  - Assess for home care needs following discharge   - Consider OT consult to assist with ADL evaluation and planning for discharge  - Provide patient education as appropriate  Outcome: Progressing  Goal: Maintains/Returns to pre admission functional level  Description: INTERVENTIONS:  - Perform BMAT or MOVE assessment daily    - Set and communicate daily mobility goal to care team and patient/family/caregiver  - Collaborate with rehabilitation services on mobility goals if consulted  - Perform Range of Motion 3 times a day  - Reposition patient every 3 hours    - Dangle patient 3 times a day  - Stand patient 3 times a day  - Ambulate patient 3 times a day  - Out of bed to chair 3 times a day   - Out of bed for meals 3 times a day  - Out of bed for toileting  - Record patient progress and toleration of activity level   Outcome: Progressing     Problem: Prexisting or High Potential for Compromised Skin Integrity  Goal: Skin integrity is maintained or improved  Description: INTERVENTIONS:  - Identify patients at risk for skin breakdown  - Assess and monitor skin integrity  - Assess and monitor nutrition and hydration status  - Monitor labs   - Assess for incontinence   - Turn and reposition patient  - Assist with mobility/ambulation  - Relieve pressure over bony prominences  - Avoid friction and shearing  - Provide appropriate hygiene as needed including keeping skin clean and dry  - Evaluate need for skin moisturizer/barrier cream  - Collaborate with interdisciplinary team   - Patient/family teaching  - Consider wound care consult   Outcome: Progressing

## 2022-08-15 NOTE — OCCUPATIONAL THERAPY NOTE
Occupational Therapy Cx Note     Patient Name: Jamie Pichardo  [id number] Date: 8/15/2022  Problem List  Principal Problem:    Femur fracture, right [facility])  Active Problems:    Primary hypertension    Acquired hypothyroidism              08/15/22 0220   OT Last Visit   OT Visit Date 08/15/22   Note Type   Note type Cancelled Session   Cancel Reasons Medical status   Additional Comments OT orders received, Chart reviewed  Pt with R femur fracture  Pending ortho consult and OT order for post op evaluation;  will follow up post-op as able & appropriate       Dom Cisneros OTR/LUCHO

## 2022-08-16 PROBLEM — D62 ACUTE BLOOD LOSS ANEMIA: Status: ACTIVE | Noted: 2022-08-16

## 2022-08-16 PROCEDURE — 99232 SBSQ HOSP IP/OBS MODERATE 35: CPT | Performed by: INTERNAL MEDICINE

## 2022-08-16 PROCEDURE — 97167 OT EVAL HIGH COMPLEX 60 MIN: CPT

## 2022-08-16 PROCEDURE — 97530 THERAPEUTIC ACTIVITIES: CPT

## 2022-08-16 PROCEDURE — 99024 POSTOP FOLLOW-UP VISIT: CPT | Performed by: PHYSICIAN ASSISTANT

## 2022-08-16 PROCEDURE — 97163 PT EVAL HIGH COMPLEX 45 MIN: CPT

## 2022-08-16 RX ORDER — ASCORBIC ACID 500 MG
500 TABLET ORAL DAILY
Status: DISCONTINUED | OUTPATIENT
Start: 2022-08-16 | End: 2022-08-19 | Stop reason: HOSPADM

## 2022-08-16 RX ORDER — FERROUS SULFATE 325(65) MG
325 TABLET ORAL
Status: DISCONTINUED | OUTPATIENT
Start: 2022-08-17 | End: 2022-08-19 | Stop reason: HOSPADM

## 2022-08-16 RX ADMIN — LEVOTHYROXINE, LIOTHYRONINE 120 MG: 76; 18 TABLET ORAL at 05:03

## 2022-08-16 RX ADMIN — ACETAMINOPHEN 325MG 975 MG: 325 TABLET ORAL at 20:17

## 2022-08-16 RX ADMIN — OXYCODONE HYDROCHLORIDE AND ACETAMINOPHEN 500 MG: 500 TABLET ORAL at 14:43

## 2022-08-16 RX ADMIN — CEFAZOLIN SODIUM 1000 MG: 1 SOLUTION INTRAVENOUS at 02:46

## 2022-08-16 RX ADMIN — ENOXAPARIN SODIUM 40 MG: 100 INJECTION SUBCUTANEOUS at 09:40

## 2022-08-16 RX ADMIN — ACETAMINOPHEN 325MG 975 MG: 325 TABLET ORAL at 14:43

## 2022-08-16 RX ADMIN — ACETAMINOPHEN 325MG 975 MG: 325 TABLET ORAL at 04:57

## 2022-08-16 RX ADMIN — LEVOTHYROXINE, LIOTHYRONINE 30 MG: 19; 4.5 TABLET ORAL at 05:03

## 2022-08-16 RX ADMIN — OXYCODONE HYDROCHLORIDE 5 MG: 5 TABLET ORAL at 09:49

## 2022-08-16 RX ADMIN — LEVOTHYROXINE, LIOTHYRONINE 15 MG: 9.5; 2.25 TABLET ORAL at 05:03

## 2022-08-16 NOTE — CASE MANAGEMENT
Case Management Discharge Planning Note    Patient name Anne Mckeon  Location [address] [id number] MRN [id number]  : 1940 Date 2022       Current Admission Date: 2022  Current Admission Diagnosis:Femur fracture, right [facility])   Patient Active Problem List    Diagnosis Date Noted    Acute blood loss anemia 2022    Femur fracture, right ([address] ) 08/15/2022    Primary hypertension 08/15/2022    Acquired hypothyroidism 08/15/2022    Symptomatic varicose veins of both lower extremities 2021      LOS (days): 2  Geometric Mean LOS (GMLOS) (days): 3 30  Days to GMLOS:1 6     OBJECTIVE:  Risk of Unplanned Readmission Score: 9 16         Current admission status: Inpatient   Preferred Pharmacy:   [address] [place] [address] #[id number] [address],[facility] Floor [address] [place] [id number]  Phone: [phone] Fax: [phone]    Primary Care Provider: Bhavna Rodas DO    Primary Insurance: Sabina Kehr [facility]  Secondary Insurance:     DISCHARGE DETAILS:    Discharge planning discussed with[de-identified] patient at bedside  Freedom of Choice: Yes  Comments - Freedom of Choice: Therapy is recommending STR  Discussed Acute facilities  Pt does not want to go to [facility] or [facility] due to distance and prefers to go to [facility]  Pt has not been vaccinated and this CM explained this may be a problem being accepted at some facilities  She adamantly is refusing to be vaccinated  She is agreeable for Cm to send out [facility] referrals including B due to this  This CM TT Oswaldo Barragan at [facility]  They can accept at all three facilities w/o Covid Vax but she states too far  CM contacted family/caregiver?: No- see comments (Pt declined   She states she has good relationship with all children and she will s/w them)        Treatment Team Recommendation: Short Term Rehab  Discharge Destination Plan[de-identified] Acute Rehab  Transport at Discharge : BLS Ambulance (hip precautions)

## 2022-08-16 NOTE — PROGRESS NOTES
Marci Hirsch  78 y o   female  MR#: [id number]  8/16/2022    Post-op days: 1  Extremity: right hip    Subjective:  Patient seen and examined this morning  Lying comfortably in bed  No issues overnight  Pain is currently controlled  She does complain of some right knee pain and stiffness from lying in bed  She has pre-existing osteoarthritis in her right knee  Denies any chest pain, shortness of breath, fevers or chills  Vitals:   Vitals:    08/16/22 0720   BP: 125/62   Pulse: 91   Resp:    Temp: 98 5 °F (36 9 °C)   SpO2: 95%       Exam:   A&O x 3 NAD  Right hip:  Mepilex dressings intact  Most proximal dressing has mild sanguinous drainage showing  Thigh swollen but compressible  Calf soft, nontender  Actively moving ankle and toes  NV intact    Labs:   WBC   Recent Labs     08/14/22  2043 08/15/22  0527   WBC 6 10 7 11     H/H   Recent Labs     08/14/22  2043 08/15/22  0527   HGB 13 3 11 3*   /  Recent Labs     08/14/22  2043 08/15/22  0527   HCT 41 8 35 8     Sed Rate No results for input(s): SEDRATE in the last 72 hours  CRP No results for input(s): CRP in the last 72 hours  Assessment:   S/p right long TFNA    Plan:   WBAT to RLE with assistance  PT/OT  Encourage incentive spirometry  Pain control  DVT prophylaxis: Lovenox and mechanical  ABLA: Hbg 11 3  Drop of 2 g from yesterday   Will continue to monitor vitals and H/H  DC planning

## 2022-08-16 NOTE — PLAN OF CARE
Problem: PHYSICAL THERAPY ADULT  Goal: Performs mobility at highest level of function for planned discharge setting  See evaluation for individualized goals  Description: Treatment/Interventions: ADL retraining, Functional transfer training, LE strengthening/ROM, Elevations, Therapeutic exercise, Endurance training, Patient/family training, Equipment eval/education, Bed mobility, Compensatory technique education, Gait training, Spoke to nursing, Spoke to case management  Equipment Recommended: Maria Guadalupe Patterson       See flowsheet documentation for full assessment, interventions and recommendations  Note: Prognosis: Fair  Problem List: Decreased strength, Decreased range of motion, Decreased endurance, Impaired balance, Decreased mobility, Decreased coordination, Orthopedic restrictions, Decreased skin integrity, Pain  Assessment: Pt is an 78 y o  female who presented to ED 8/14/22 with c/o R hip pain after fall while watering lawn  Dx:  R Displaced Femur fx;  8/15/22: IM nail of R femur; Comorbidities affecting pt's physical performance at time of assessment include: arthritis, thyroid disease, HTN  Personal factors affecting pt at time of IE include: pain, arthritis  PLOF and home set up listed above;  concerns for return home include but are not limited to physical assist, elevations, unable to care for self, pain  Upon evaluation: Pt requires max A x 2 for bed mobility, mod A x 2 for sit to stand, and mod A x 2 for stepping to chair with RW  Full objective findings from PT assessment regarding body systems outlined above  Current limitations include limited RLE ROM and strength, impaired balance, decreased endurance/activity tolerance, gait deviations, fall risk and pain  The following objective measures performed on IE also reveal limitations: urinary incontinence   Pt's clinical presentation is currently unstable/unpredictable seen in pt's presentation of continuous monitoring in hospital, fall risk, pain, urinary incontinence  Pt would benefit from continued PT while in hospital and follow up with inpt rehab at D/C to increase strength, balance, endurance, independence with funcitonal mobility to return to PLOF, maximize independence, decrease caregiver burden and improve quality of life  The patient's AM-PAC Basic Mobility Inpatient Short Form Raw Score is 10  A Raw score of less than or equal to 17 suggests the patient may benefit from discharge to post-acute rehabilitation services  Please also refer to the recommendation of the Physical Therapist for safe discharge planning  Barriers to Discharge: Decreased caregiver support, Inaccessible home environment (Pt lives alone, 4+1 KATHY)     PT Discharge Recommendation: Post acute rehabilitation services    See flowsheet documentation for full assessment

## 2022-08-16 NOTE — OCCUPATIONAL THERAPY NOTE
Occupational Therapy Evaluation     Patient Name: Juanjo FERREREPANTONIETA Date: 8/16/2022  Problem List  Principal Problem:    Femur fracture, right ([address] )  Active Problems:    Primary hypertension    Acquired hypothyroidism    Past Medical History  Past Medical History:   Diagnosis Date    Arthritis     Disease of thyroid gland     Hypertension      Past Surgical History  Past Surgical History:   Procedure Laterality Date    NY OPEN RX FEMUR FX+INTRAMED VIKI Right 8/15/2022    Procedure: INSERTION NAIL IM FEMUR ANTEGRADE (TROCHANTERIC); Surgeon: Britni Mesa MD;  Location:  MAIN OR;  Service: Orthopedics             08/16/22 1030   OT Last Visit   OT Visit Date 08/16/22   Note Type   Note type Evaluation   Restrictions/Precautions   Weight Bearing Precautions Per Order Yes   RLE Weight Bearing Per Order WBAT   Other Precautions O2;Fall Risk;Pain;Multiple lines   Pain Assessment   Pain Assessment Tool 0-10   Pain Score 10 - Worst Possible Pain   Pain Location/Orientation Orientation: Right;Location: Hip  (& chronic arthritis)   Patient's Stated Pain Goal No pain   Hospital Pain Intervention(s) Ambulation/increased activity;Repositioned   Home Living   Type of Home House  (4+1 KATHY)   Home Layout Stairs to enter with rails; One level   Bathroom Shower/Tub Tub/shower unit   Bathroom Equipment Grab bars in shower   Additional Comments Does not own any DME/AD   Prior Function   Level of [place] Independent with ADLs and functional mobility   Lives With Alone   Receives Help From Family; [address] in the last 6 months 1 to 4   Comments (+) Drives, grocery shops, cooks & cleans   Lifestyle   Autonomy Very independent at baseline with all ADL/IADLs   Reciprocal Relationships Neighbor & sons   Intrinsic Gratification Gardening, computer   Subjective   Subjective "I'm leaking!!"   ADL   Eating Assistance 7  Independent   Grooming Assistance 5  Supervision/Setup UB Bathing Assistance 5  Supervision/Setup   LB Bathing Assistance 3  Moderate Assistance   UB Dressing Assistance 5  Supervision/Setup   LB Dressing Assistance 2  Maximal Assistance   Toileting Assistance  2  Maximal Assistance   Bed Mobility   Supine to Sit 2  Maximal assistance   Additional items Assist x 2; Increased time required;Verbal cues;LE management;HOB elevated   Additional Comments Pt remains OOB to recliner at end of session with chair alarm on   Transfers   Sit to Stand 3  Moderate assistance   Additional items Assist x 2; Increased time required;Verbal cues;HOB elevated   Stand to Sit 4  Minimal assistance   Additional items Assist x 2;Armrests; Verbal cues; Increased time required   Stand pivot 3  Moderate assistance   Additional items Assist x 2; Increased time required;Verbal cues;Armrests  (RW)   Balance   Static Sitting Fair -   Dynamic Sitting Fair -   Static Standing Poor +   Dynamic Standing Poor +   Ambulatory Poor +   Activity Tolerance   Activity Tolerance Patient limited by fatigue;Patient limited by pain   Medical Staff Made Aware PT Siobhan   Nurse Made Aware ALEN Gracia Assessment   RUE Assessment WFL   LUE Assessment   LUE Assessment WFL   Cognition   Overall Cognitive Status WFL   Arousal/Participation Alert   Attention Within functional limits   Orientation Level Oriented X4   Memory Within functional limits   Following Commands Follows one step commands without difficulty   Assessment   Limitation Decreased ADL status; Decreased Safe judgement during ADL;Decreased endurance;Decreased self-care trans;Decreased high-level ADLs   Prognosis Good   Assessment Pt is a 78 y o  female seen for OT evaluation at [facility], admitted 8/14/2022 w/ Femur fracture, right ([address] )  Pt is now s/p insertion nail IM femur with RLE WBAT orders  OT completed extensive review of pt's medical and social history   Comorbidities affecting pt's functional performance at time of assessment include: HTN, hypothyroidism, symptomatic varicose veins of BLE, arthritis (pt self-report)  Personal factors affecting pt at time of IE include: steps to enter environment, limited home support, behavioral pattern, difficulty performing ADLS, difficulty performing IADLS , decreased initiation and engagement  and environment  Prior to admission, pt was living alone in a [facility] with 4+1 KATHY  Pt was I w/  ADLS and IADLS, (+) drove, & required use of no DME/AD PTA  Upon evaluation: Pt requires Max Ax2 for bed mobility, Mod Ax2 for functional mobility/transfers, S for UB ADLs and Max Ax2 for LB ADLS 2* the following deficits impacting occupational performance: weakness, decreased balance, decreased tolerance, increased pain and decreased coping skills  Full objective findings from OT assessment regarding body systems outlined above  Pt to benefit from continued skilled OT tx while in the hospital to address deficits as defined above and maximize level of functional independence w/ ADL's and functional mobility  Occupational Performance areas to address include: bathing/shower, toilet hygiene, dressing, medication management, functional mobility, community mobility and clothing management  Based on findings, pt is of high complexity  The patient's raw score on the AM-PAC Daily Activity inpatient short form is 18, standardized score is 38 66, less than 39 4  Patients at this level are likely to benefit from DC to post-acute rehabilitation services, which does coincide with current above OT recommendations  However, please refer to therapist recommendation for discharge planning given other factors that may influence destination  At this time, OT recommendations at time of discharge are STR  Goals   Patient Goals Pt wants to have less pain   Plan   Treatment Interventions ADL retraining;Functional transfer training;Patient/family training;Equipment evaluation/education; Compensatory technique education; Energy conservation   Goal Expiration Date 08/26/22   OT Treatment Day 0   OT Frequency 2-3x/wk   Recommendation   OT Discharge Recommendation Post acute rehabilitation services   AM-PAC Daily Activity Inpatient   Lower Body Dressing 2   Bathing 2   Toileting 2   Upper Body Dressing 4   Grooming 4   Eating 4   Daily Activity Raw Score 18   Daily Activity Standardized Score (Calc for Raw Score >=11) 38 66   AM-PAC Applied Cognition Inpatient   Following a Speech/Presentation 4   Understanding Ordinary Conversation 4   Taking Medications 4   Remembering Where Things Are Placed or Put Away 4   Remembering List of 4-5 Errands 4   Taking Care of Complicated Tasks 4   Applied Cognition Raw Score 24   Applied Cognition Standardized Score 62 21     Pt will achieve the following goals within 10 days  *Pt will complete LB bathing and dressing with S & DME PRN  *Pt will complete toileting w/ S w/ G hygiene/thoroughness using DME PRN    *Pt will complete bed mobility with Min Ax1, with HOB elevated & use of side rails PRN to prep for purposeful tasks    *Pt will perform functional transfers with on/off all surfaces with Min Ax1 using DME as needed w/ G balance/safety  *Pt will increase standing tolerance to 5 minutes in order to complete sinkside ADL  *Pt will improve functional mobility during ADL/IADL/leisure tasks to Min Ax1 using DME as needed w/ G balance/safety  *Pt will demonstrate 100% carryover of precautions s/p review w/o cues w/ Mod I w/ G tolerance/participation t/o functional ADL/IADL/leisure tasks      Char Salazar, OTR/L

## 2022-08-16 NOTE — PROGRESS NOTES
[place]  Progress Note - Pankaj Castañeda 1940, 78 y o  female MRN: [id number]  Unit/Bed#: -01 Encounter: [id number]  Primary Care Provider: Nilesh Hoffmann DO   Date and time admitted to hospital: 8/14/2022  8:30 PM    Acute blood loss anemia  Assessment & Plan  Acute postoperative blood-loss anemia  Hemoglobin this morning is 11 3  On iron and vitamin-C    Acquired hypothyroidism  Assessment & Plan  · Home regimen: armour thryoid 165mg daily   · Continue with pt home supply     Primary hypertension  Assessment & Plan  · Home regimen: lisinopril 5mg BID  · Monitor vitals as per protocol    * Femur fracture, right (HCC)  Assessment & Plan  · S/p mechanical fall on to right hip  · S/P right long TFNA  · XR with right femur fracture that is displace  · No head strike, LOC, or other injury   · NV intact on admission - continue NV checks   · Orthopedic on board  · Pain control with tylenol, oxycodone, robaxin, and dilaudid   · PT, OT, and CM post surgery   · Trend H&H  · Encourage incentive spirometer use  · Wound care and dressing changes per Orthopedics        Labs & Imaging: I have personally reviewed pertinent reports  VTE Prophylaxis: in place  Code Status:   Level 1 - Full Code    Patient Centered Rounds: I have performed bedside rounds with nursing staff today  Discussions with Specialists or Other Care Team Provider:  Orthopedic    Education and Discussions with Family / Patient:  Patient will update the family    Current Length of Stay: 2 day(s)    Current Patient Status: Inpatient   Certification Statement: The patient will continue to require additional inpatient hospital stay due to see my assessment and plan  Subjective:   Patient is seen and examined at bedside  Pain is well controlled  No other complaints  Afebrile  All other ROS are negative  Objective:    Vitals: Blood pressure 134/62, pulse 89, temperature 98 5 °F (36 9 °C), resp   rate 16, weight 66 6 kg (146 lb 13 2 oz), SpO2 91 %, not currently breastfeeding  ,Body mass index is 23 34 kg/m²  SPO2 RA Rest    Flowsheet Row ED to Hosp-Admission (Current) from 8/14/2022 in [address] Med Surg Unit   SpO2 91 %   SpO2 Activity At Rest   O2 Device Nasal cannula   O2 Flow Rate --        I&O:     Intake/Output Summary (Last 24 hours) at 8/16/2022 1344  Last data filed at 8/16/2022 0840  Gross per 24 hour   Intake 330 ml   Output 150 ml   Net 180 ml       Physical Exam:    General- Alert, sitting comfortably in chair  Not in any acute distress  Neck- Supple, No JVD  CVS- regular, S1 and S2 normal  Chest- Bilateral Air entry, No rhochi, crackles or wheezing present  Abdomen- soft, nontender, not distended, no guarding or rigidity, BS+  Extremities-  No pedal edema, No calf tenderness  Right hip-dressing in place which is C/D/I  CNS-   Alert, awake and orientedx3  No focal deficits present  Invasive Devices  Report    Peripheral Intravenous Line  Duration           Peripheral IV 08/15/22 Dorsal (posterior); Left Forearm <1 day          Drain  Duration           External Urinary Catheter 1 day                      Social History  reviewed  Family History   Problem Relation Age of Onset    Heart attack Mother     No Known Problems Father     reviewed    Meds:  Current Facility-Administered Medications   Medication Dose Route Frequency Provider Last Rate Last Admin    acetaminophen (TYLENOL) tablet 975 mg  975 mg Oral [facility] Paulette Paula PA-C   975 mg at 08/16/22 0457    ascorbic acid (VITAMIN C) tablet 500 mg  500 mg Oral Daily Gely Lorenz MD        enoxaparin (LOVENOX) subcutaneous injection 40 mg  40 mg Subcutaneous Daily Paulette Paula PA-C   40 mg at 08/16/22 0940    [START ON 8/17/2022] ferrous sulfate tablet 325 mg  325 mg Oral Daily With Breakfast Gely Lorenz MD        HYDROmorphone (DILAUDID) injection 0 2 mg  0 2 mg Intravenous Q4H PRN YESENIA Hendrix lisinopril (ZESTRIL) tablet 5 mg  5 mg Oral Once Cheryl Labrador, PA-C        methocarbamol (ROBAXIN) tablet 500 mg  500 mg Oral Q6H PRN Cheryl Labkatieor, PA-C   500 mg at 08/15/22 2113    ondansetron (ZOFRAN) injection 4 mg  4 mg Intravenous Q6H PRN Cheryl Labrador, PA-C        oxyCODONE (ROXICODONE) IR tablet 2 5 mg  2 5 mg Oral Q6H PRN Cheryl Labrador, PA-C        Or    oxyCODONE (ROXICODONE) IR tablet 5 mg  5 mg Oral Q6H PRN Cheryl Labkatieor, PA-C   5 mg at 08/16/22 1413    senna (SENOKOT) tablet 8 6 mg  1 tablet Oral HS PRN Cheryl Labrador, PA-C        thyroid (ARMOUR) tablet 15 mg  15 mg Oral Early Morning Cheryl Labrador, PA-C   15 mg at 08/16/22 0503    And    thyroid (ARMOUR) tablet 120 mg  120 mg Oral Early Morning Cheryl Labrador, PA-C   120 mg at 08/16/22 0503    And    thyroid desiccated (ARMOUR) tablet 30 mg  30 mg Oral Early Morning Cheryl Labrador, PA-C   30 mg at 08/16/22 0503      Medications Prior to Admission   Medication    ARMOUR THYROID PO    Cholecalciferol (VITAMIN D3 PO)    Coenzyme Q10 (CO Q10) 100 MG CAPS    DHEA 10 MG TABS    lisinopril (ZESTRIL) 5 mg tablet    Zinc Sulfate (ZINC 15 PO)       Labs:  Results from last 7 days   Lab Units 08/15/22  0527 08/14/22  2043   WBC Thousand/uL 7 11 6 10   HEMOGLOBIN g/dL 11 3* 13 3   HEMATOCRIT % 35 8 41 8   PLATELETS Thousands/uL 132* 161   NEUTROS PCT %  --  52   LYMPHS PCT %  --  37   MONOS PCT %  --  10   EOS PCT %  --  1     Results from last 7 days   Lab Units 08/15/22  0527 08/14/22  2043   POTASSIUM mmol/L 4 4 3 8   CHLORIDE mmol/L 107 104   CO2 mmol/L 31 31   BUN mg/dL 20 23   CREATININE mg/dL 0 80 1 08   CALCIUM mg/dL 8 4 9 2     Lab Results   Component Value Date    TROPONINI <0 02 01/23/2021     Results from last 7 days   Lab Units 08/15/22  0527 08/14/22 2043   INR  0 99 0 91     No results found for: Noah Lyn SPUTUMCULTUR      Imaging:  Results for orders placed during the hospital encounter of 08/14/22    XR chest portable    Narrative  CHEST    INDICATION:   TRAUMA  COMPARISON:  03/30/2022  Also CT on 11 07/05/2022    EXAM PERFORMED/VIEWS:  XR CHEST PORTABLE  1 image    FINDINGS:    Cardiomediastinal silhouette appears enlarged  No evidence of heart failure  Chronic changes present  Nodule again noted in the right midlung  No pneumothorax or pleural effusion  Osseous structures appear within normal limits for patient age  Calcific tendinitis in the right shoulder  Impression  No acute cardiopulmonary disease  Stable nodule in the right midlung  Please see CT report of 07/05/2022  Workstation performed: [id number]    Results for orders placed during the hospital encounter of 03/30/22    XR chest pa & lateral    Narrative  CHEST    INDICATION:   M[age]: Low back pain, unspecified  Right lateral rib pain since fall last week  COMPARISON:  Chest radiograph from 1/23/2021  EXAM PERFORMED/VIEWS:  XR CHEST PA & LATERAL  DUAL ENERGY SUBTRACTION  FINDINGS:    Cardiomediastinal silhouette appears unremarkable  No effusion or pneumothorax  9 mm nodule projecting over the right midlung  Nodule projecting over the right base is due to the nipple  Acute minimally displaced fractures of the lateral right 7th and 8th ribs  Impression  Acute minimally displaced fractures of the lateral right 7th and 8th ribs  No effusion or pneumothorax  9 mm nodule projecting over the right midlung  Recommend further evaluation with a chest CT with no contrast     This study was marked for significant notification and follow-up              Workstation performed: [id number]      Last 24 Hours Medication List:   Current Facility-Administered Medications   Medication Dose Route Frequency Provider Last Rate    acetaminophen  975 mg Oral [facility] Yevgeniy Maki [place]      ascorbic acid  500 mg Oral Daily Ivette Cedillo MD      enoxaparin  40 mg Subcutaneous Daily Yevgeniy Alert, PA-C      [START ON 8/17/2022] ferrous sulfate  325 mg Oral Daily With Breakfast Radhika Montejo MD      HYDROmorphone  0 2 mg Intravenous Q4H PRN Threasa Acre, PA-C      lisinopril  5 mg Oral Once Threasa Acre, PA-C      methocarbamol  500 mg Oral Q6H PRN Threasa Acre, PA-C      ondansetron  4 mg Intravenous Q6H PRN Threasa Acre, PA-C      oxyCODONE  2 5 mg Oral Q6H PRN Threasa Acre, PA-C      Or    oxyCODONE  5 mg Oral Q6H PRN Threasa Acre, PA-C      senna  1 tablet Oral HS PRN Threasa Acre, PA-C      thyroid  15 mg Oral Early Morning Threasa Acre, PA-C      And    thyroid  120 mg Oral Early Morning Threasa Acre, PA-C      And    thyroid desiccated  30 mg Oral Early Morning Threasa Acre, PA-C          Today, Patient Was Seen By: Radhika Montejo MD    ** Please Note: Dictation voice to text software may have been used in the creation of this document   **

## 2022-08-16 NOTE — ARC ADMISSION
ARC admissions team received referral on patient for possible ARC placement  Per [facility] admissions the patient is deemed ARC appropriate at this time pending medical readiness / bed availability  CM made aware     Thank you, Boaz Mcdonald  [address] [facility] Ad[facility]

## 2022-08-16 NOTE — PHYSICAL THERAPY NOTE
PHYSICAL THERAPY Evaluation    Performed at least 2 patient identifiers during session:  Patient Active Problem List   Diagnosis    Symptomatic varicose veins of both lower extremities    Femur fracture, right ([address] )    Primary hypertension    Acquired hypothyroidism       Past Medical History:   Diagnosis Date    Arthritis     Disease of thyroid gland     Hypertension        Past Surgical History:   Procedure Laterality Date    CA OPEN RX FEMUR FX+INTRAMED VIKI Right 8/15/2022    Procedure: INSERTION NAIL IM FEMUR ANTEGRADE (TROCHANTERIC); Surgeon: Palmer Vasquez MD;  Location:  MAIN OR;  Service: Orthopedics        08/16/22 1050   PT Last Visit   PT Visit Date 08/16/22   Note Type   Note type Evaluation   Pain Assessment   Pain Assessment Tool 0-10   Pain Score 10 - Worst Possible Pain   Pain Location/Orientation Orientation: Right;Location: Hip  (chronic arthritis in whole body)   Hospital Pain Intervention(s) Ambulation/increased activity   Restrictions/Precautions   Weight Bearing Precautions Per Order Yes   RLE Weight Bearing Per Order WBAT   Other Precautions Fall Risk;Pain;Bed Alarm; Chair Alarm   Home Living   Type of Home House  (4+1 KATHY)   Home Layout Stairs to enter with rails; One level   Bathroom Shower/Tub Tub/shower unit   Bathroom Equipment Grab bars in shower   Additional Comments Does not own any DME/AD   Prior Function   Level of [place] Independent with ADLs and functional mobility   Lives With Alone   Receives Help From Family   ADL Assistance Independent   IADLs Independent   Falls in the last 6 months 1 to 4   Vocational Retired   Comments (+) Drives, grocery shops, cooks & cleans; Pt sleeps in recliner chair 2/2 arthritis     General   Family/Caregiver Present No   Cognition   Overall Cognitive Status WFL   Arousal/Participation Alert   Orientation Level Oriented X4   Memory Within functional limits Following Commands Follows all commands and directions without difficulty   Subjective   Subjective "I am leaking all the time "   RUE Assessment   RUE Assessment WFL   LUE Assessment   LUE Assessment WFL   RLE Assessment   RLE Assessment   (1/5 hip flex/abd/add;  2-/5 knee extension, 3/5 df)   LLE Assessment   LLE Assessment WFL   Bed Mobility   Supine to Sit 2  Maximal assistance   Additional items Assist x 2   Additional Comments Pt remains OOB in chair at end of session;  pt sleeps in recliner chair at baseline 2/2 arthritis  Transfers   Sit to Stand 3  Moderate assistance   Additional items Assist x 2; Increased time required   Stand to Sit 4  Minimal assistance   Additional items Assist x 2;Armrests; Increased time required   Stand pivot 3  Moderate assistance   Additional items Assist x 2; Increased time required;Verbal cues  (RW)   Ambulation/Elevation   Gait pattern Decreased foot clearance; Forward Flexion;Decreased R stance   Gait Assistance 3  Moderate assist   Additional items Assist x 2   Assistive Device Rolling walker   Distance 2ft bed to chair, forward flexed posture;  pt states she does not stand upright at baseline 2/2 arthritis, forward head  Balance   Static Sitting Fair -   Dynamic Sitting Fair -   Static Standing Poor +  (RW)   Dynamic Standing Poor +  (RW)   Ambulatory Poor +  (RW)   Activity Tolerance   Activity Tolerance Patient limited by fatigue;Patient limited by pain   Nurse Made Aware Jessica Rosenthal   Assessment   Prognosis Fair   Problem List Decreased strength;Decreased range of motion;Decreased endurance; Impaired balance;Decreased mobility; Decreased coordination;Orthopedic restrictions;Decreased skin integrity;Pain   Assessment Pt is an 78 y o  female who presented to ED 8/14/22 with c/o R hip pain after fall while watering lawn  Dx:  R Displaced Femur fx;  8/15/22: IM nail of R femur;   Comorbidities affecting pt's physical performance at time of assessment include: arthritis, thyroid disease, HTN  Personal factors affecting pt at time of IE include: pain, arthritis  PLOF and home set up listed above;  concerns for return home include but are not limited to physical assist, elevations, unable to care for self, pain  Upon evaluation: Pt requires max A x 2 for bed mobility, mod A x 2 for sit to stand, and mod A x 2 for stepping to chair with RW  Full objective findings from PT assessment regarding body systems outlined above  Current limitations include limited RLE ROM and strength, impaired balance, decreased endurance/activity tolerance, gait deviations, fall risk and pain  The following objective measures performed on IE also reveal limitations: urinary incontinence  Pt's clinical presentation is currently unstable/unpredictable seen in pt's presentation of continuous monitoring in hospital, fall risk, pain, urinary incontinence  Pt would benefit from continued PT while in hospital and follow up with inpt rehab at D/C to increase strength, balance, endurance, independence with funcitonal mobility to return to PLOF, maximize independence, decrease caregiver burden and improve quality of life  The patient's AM-PAC Basic Mobility Inpatient Short Form Raw Score is 10  A Raw score of less than or equal to 17 suggests the patient may benefit from discharge to post-acute rehabilitation services  Please also refer to the recommendation of the Physical Therapist for safe discharge planning  Barriers to Discharge Decreased caregiver support; Inaccessible home environment  (Pt lives alone, 4+1 KATHY)   Goals   Patient Goals to have less pain   STG Expiration Date 08/30/22   Short Term Goal #1 Pt will be able to demo:  mod I sit to/from supine, mod I sit to/from standing with RW, mod I to ambulate 150ft with RW, mod I to ascend/descend 4 steps with handrail; to return home at mod I level, decrease caregiver burden and improve independence and quality of life   Plan   Treatment/Interventions ADL retraining;Functional transfer training;LE strengthening/ROM; Elevations; Therapeutic exercise; Endurance training;Patient/family training;Equipment eval/education; Bed mobility; Compensatory technique education;Gait training;Spoke to nursing;Spoke to case management   PT Frequency 4-6x/wk   Recommendation   PT Discharge Recommendation Post acute rehabilitation services   Equipment Recommended [facility] Recommended Wheeled walker   AM-PAC Basic Mobility Inpatient   Turning in Bed Without Bedrails 2   Lying on Back to Sitting on Edge of Flat Bed 2   Moving Bed to Chair 2   Standing Up From Chair 2   Walk in Room 1   Climb 3-5 Stairs 1   Basic Mobility Inpatient Raw Score 10   Turning Head Towards Sound 4   Follow Simple Instructions 3   Low Function Basic Mobility Raw Score 17   Low Function Basic Mobility Standardized Score 27 46   Highest Level Of Mobility   JH-HLM Goal 4: Move to chair/commode   JH-HLM Achieved 4: Move to chair/commode       Elsy West, PT    Patient Name: Laisha Cartwright  [id number] Date: 8/16/2022

## 2022-08-16 NOTE — PLAN OF CARE
Problem: OCCUPATIONAL THERAPY ADULT  Goal: Performs self-care activities at highest level of function for planned discharge setting  See evaluation for individualized goals  Description: Treatment Interventions: ADL retraining, Functional transfer training, Patient/family training, Equipment evaluation/education, Compensatory technique education, Energy conservation          See flowsheet documentation for full assessment, interventions and recommendations  Note: Limitation: Decreased ADL status, Decreased Safe judgement during ADL, Decreased endurance, Decreased self-care trans, Decreased high-level ADLs  Prognosis: Good  Assessment: Pt is a 78 y o  female seen for OT evaluation at [facility], admitted 8/14/2022 w/ Femur fracture, right ([address] )  Pt is now s/p insertion nail IM femur with RLE WBAT orders  OT completed extensive review of pt's medical and social history  Comorbidities affecting pt's functional performance at time of assessment include: HTN, hypothyroidism, symptomatic varicose veins of BLE, arthritis (pt self-report)  Personal factors affecting pt at time of IE include: steps to enter environment, limited home support, behavioral pattern, difficulty performing ADLS, difficulty performing IADLS , decreased initiation and engagement  and environment  Prior to admission, pt was living alone in a [facility] with 4+1 KATHY  Pt was I w/  ADLS and IADLS, (+) drove, & required use of no DME/AD PTA  Upon evaluation: Pt requires Max Ax2 for bed mobility, Mod Ax2 for functional mobility/transfers, S for UB ADLs and Max Ax2 for LB ADLS 2* the following deficits impacting occupational performance: weakness, decreased balance, decreased tolerance, increased pain and decreased coping skills  Full objective findings from OT assessment regarding body systems outlined above   Pt to benefit from continued skilled OT tx while in the hospital to address deficits as defined above and maximize level of functional independence w/ ADL's and functional mobility  Occupational Performance areas to address include: bathing/shower, toilet hygiene, dressing, medication management, functional mobility, community mobility and clothing management  Based on findings, pt is of high complexity  The patient's raw score on the AM-PAC Daily Activity inpatient short form is 18, standardized score is 38 66, less than 39 4  Patients at this level are likely to benefit from DC to post-acute rehabilitation services, which does coincide with current above OT recommendations  However, please refer to therapist recommendation for discharge planning given other factors that may influence destination  At this time, OT recommendations at time of discharge are STR       OT Discharge Recommendation: Post acute rehabilitation services

## 2022-08-16 NOTE — PLAN OF CARE
Problem: Potential for Falls  Goal: Patient will remain free of falls  Description: INTERVENTIONS:  - Educate patient/family on patient safety including physical limitations  - Instruct patient to call for assistance with activity   - Consult OT/PT to assist with strengthening/mobility   - Keep Call bell within reach  - Keep bed low and locked with side rails adjusted as appropriate  - Keep care items and personal belongings within reach  - Initiate and maintain comfort rounds  - Make Fall Risk Sign visible to staff  - Offer Toileting every 2 Hours, in advance of need  - Obtain necessary fall risk management equipment: nonskid footwear  - Apply yellow socks and bracelet for high fall risk patients  - Consider moving patient to room near nurses station  Outcome: Progressing

## 2022-08-17 LAB
ANION GAP SERPL CALCULATED.3IONS-SCNC: 5 MMOL/L (ref 4–13)
BASOPHILS # BLD AUTO: 0.02 THOUSANDS/ΜL (ref 0–0.1)
BASOPHILS NFR BLD AUTO: 0 % (ref 0–1)
BUN SERPL-MCNC: 21 MG/DL (ref 5–25)
CALCIUM SERPL-MCNC: 8.7 MG/DL (ref 8.3–10.1)
CHLORIDE SERPL-SCNC: 106 MMOL/L (ref 96–108)
CO2 SERPL-SCNC: 32 MMOL/L (ref 21–32)
CREAT SERPL-MCNC: 0.71 MG/DL (ref 0.6–1.3)
EOSINOPHIL # BLD AUTO: 0.06 THOUSAND/ΜL (ref 0–0.61)
EOSINOPHIL NFR BLD AUTO: 1 % (ref 0–6)
ERYTHROCYTE [DISTWIDTH] IN BLOOD BY AUTOMATED COUNT: 14.6 % (ref 11.6–15.1)
GFR SERPL CREATININE-BSD FRML MDRD: 79 ML/MIN/1.73SQ M
GLUCOSE SERPL-MCNC: 122 MG/DL (ref 65–140)
HCT VFR BLD AUTO: 29 % (ref 34.8–46.1)
HGB BLD-MCNC: 9.1 G/DL (ref 11.5–15.4)
IMM GRANULOCYTES # BLD AUTO: 0.02 THOUSAND/UL (ref 0–0.2)
IMM GRANULOCYTES NFR BLD AUTO: 0 % (ref 0–2)
LYMPHOCYTES # BLD AUTO: 0.95 THOUSANDS/ΜL (ref 0.6–4.47)
LYMPHOCYTES NFR BLD AUTO: 11 % (ref 14–44)
MCH RBC QN AUTO: 29.6 PG (ref 26.8–34.3)
MCHC RBC AUTO-ENTMCNC: 31.4 G/DL (ref 31.4–37.4)
MCV RBC AUTO: 95 FL (ref 82–98)
MONOCYTES # BLD AUTO: 0.92 THOUSAND/ΜL (ref 0.17–1.22)
MONOCYTES NFR BLD AUTO: 11 % (ref 4–12)
NEUTROPHILS # BLD AUTO: 6.53 THOUSANDS/ΜL (ref 1.85–7.62)
NEUTS SEG NFR BLD AUTO: 77 % (ref 43–75)
NRBC BLD AUTO-RTO: 0 /100 WBCS
PLATELET # BLD AUTO: 126 THOUSANDS/UL (ref 149–390)
PMV BLD AUTO: 10.5 FL (ref 8.9–12.7)
POTASSIUM SERPL-SCNC: 4.2 MMOL/L (ref 3.5–5.3)
RBC # BLD AUTO: 3.07 MILLION/UL (ref 3.81–5.12)
SODIUM SERPL-SCNC: 143 MMOL/L (ref 135–147)
WBC # BLD AUTO: 8.5 THOUSAND/UL (ref 4.31–10.16)

## 2022-08-17 PROCEDURE — 97116 GAIT TRAINING THERAPY: CPT

## 2022-08-17 PROCEDURE — 99232 SBSQ HOSP IP/OBS MODERATE 35: CPT | Performed by: PHYSICIAN ASSISTANT

## 2022-08-17 PROCEDURE — 85025 COMPLETE CBC W/AUTO DIFF WBC: CPT | Performed by: INTERNAL MEDICINE

## 2022-08-17 PROCEDURE — 99024 POSTOP FOLLOW-UP VISIT: CPT | Performed by: PHYSICIAN ASSISTANT

## 2022-08-17 PROCEDURE — 80048 BASIC METABOLIC PNL TOTAL CA: CPT | Performed by: INTERNAL MEDICINE

## 2022-08-17 PROCEDURE — 97530 THERAPEUTIC ACTIVITIES: CPT

## 2022-08-17 RX ADMIN — ACETAMINOPHEN 325MG 975 MG: 325 TABLET ORAL at 21:58

## 2022-08-17 RX ADMIN — LEVOTHYROXINE, LIOTHYRONINE 30 MG: 19; 4.5 TABLET ORAL at 05:01

## 2022-08-17 RX ADMIN — OXYCODONE HYDROCHLORIDE 2.5 MG: 5 TABLET ORAL at 17:00

## 2022-08-17 RX ADMIN — OXYCODONE HYDROCHLORIDE AND ACETAMINOPHEN 500 MG: 500 TABLET ORAL at 08:36

## 2022-08-17 RX ADMIN — FERROUS SULFATE TAB 325 MG (65 MG ELEMENTAL FE) 325 MG: 325 (65 FE) TAB at 08:36

## 2022-08-17 RX ADMIN — ENOXAPARIN SODIUM 40 MG: 100 INJECTION SUBCUTANEOUS at 08:36

## 2022-08-17 RX ADMIN — LEVOTHYROXINE, LIOTHYRONINE 15 MG: 9.5; 2.25 TABLET ORAL at 05:01

## 2022-08-17 RX ADMIN — LEVOTHYROXINE, LIOTHYRONINE 120 MG: 76; 18 TABLET ORAL at 05:01

## 2022-08-17 RX ADMIN — ACETAMINOPHEN 325MG 975 MG: 325 TABLET ORAL at 05:00

## 2022-08-17 RX ADMIN — ACETAMINOPHEN 325MG 975 MG: 325 TABLET ORAL at 13:19

## 2022-08-17 NOTE — ASSESSMENT & PLAN NOTE
S/p mechanical fall on to right hip  XR with right femur fracture that is displaced  No head strike, LOC, or other injury  · NV intact on admission  · POD #2 - S/P right long TFNA  · Orthopedic surgery following:  · WBAT to RLE with assistance  · DVT prophylaxis: Lovenox and mechanical  Recommend  mg BID x 6 weeks at discharge  · Ortho stable for discharge to rehab  Follow up as outpatient with Dr Shyam Hurley in 10-14 days    · Pain control with tylenol, oxycodone, robaxin  · PT/OT recommending acute rehab, CM on board for placement  · Encourage incentive spirometer use  · Wound care and dressing changes per Orthopedics

## 2022-08-17 NOTE — PHYSICAL THERAPY NOTE
PHYSICAL THERAPY NOTE       08/17/22 0901   PT Last Visit   PT Visit Date 08/17/22   Note Type   Note Type Treatment for insurance authorization  [facility] requesting per e-mail list)   Pain Assessment   Pain Assessment Tool Merchant-Baker FACES   Merchant-Baker FACES Pain Rating 2   Pain Location/Orientation Orientation: Right;Location: Hip   Hospital Pain Intervention(s) Repositioned   Restrictions/Precautions   RLE Weight Bearing Per Order WBAT   Other Precautions Fall Risk;Pain; Chair Alarm; Bed Alarm   General   Chart Reviewed Yes   Additional Pertinent History Pt reclined in chair with purewick in place despite extensive education for OOB for all toileting and white board updated with "to/from commode for all toileting "  Pt states she purewick over night and this morning, "They just put it on me "   Family/Caregiver Present No   Cognition   Overall Cognitive Status WFL   Arousal/Participation Cooperative   Attention Within functional limits   Orientation Level Oriented X4   Memory Within functional limits   Following Commands Follows all commands and directions without difficulty   Bed Mobility   Additional Comments Pt OOB in recliner chair at start and end of session   Transfers   Sit to Stand 4  Minimal assistance   Additional items Assist x 1; Increased time required;Verbal cues;Armrests  (RW, performed 5x this session)   Stand to Sit 4  Minimal assistance   Additional items Assist x 1; Increased time required;Verbal cues;Armrests   Stand pivot 4  Minimal assistance   Additional items Assist x 1; Increased time required;Verbal cues  (RW)   Toilet transfer 4  Minimal assistance   Additional items Assist x 1; Increased time required;Commode  (RW)   Additional Comments pt able to step chair to/from [facility] this session with min A x1;  pt able to perform hygiene while standing with min A x 1 for balance with RW     Ambulation/Elevation   Gait pattern Decreased foot clearance; Forward Flexion;Decreased R stance   Gait Assistance 4  Minimal assist   Additional items Assist x 1   Assistive Device Rolling walker   Distance 3ft x 2, min A for balance and RW management; forward flexion, pt states she does not stand upright at baseline 2/2 arthritis, forward head   Balance   Static Sitting Fair -   Dynamic Sitting Fair -   Static Standing Fair -  (RW)   Dynamic Standing Fair -  (RW)   Ambulatory Fair -  (RW)   Activity Tolerance   Activity Tolerance Patient limited by fatigue   Exercises   Quad Sets 10 reps;AROM; Bilateral  (reclined)   Ankle Pumps 10 reps;AROM; Bilateral  (reclined)   Assessment   Prognosis Fair   Problem List Decreased strength;Decreased range of motion;Decreased endurance; Impaired balance;Decreased mobility; Decreased coordination;Decreased skin integrity;Orthopedic restrictions;Pain   Assessment Treatment consisted of reclined TE,balance training, ambulation training, safety awareness, fall prevention, endurance training to increase upright positions and functional mobility  Pt with improved mobility noted with A x 1 for all transfers and stepping this session, pt able to better participate in hygiene after urinating on BSC;  Continues to require assist for all aspects of mobility and is not safe for mod I home alone DC; Re-educated pt on not using purewick and getting to/from commode with assist for all toileting needs; Pt verbalizes understanding and white board updated  Pt would benefit from continued PT while in hospital and follow up with inpt rehab at [place] to increase strength, balance, endurance, mobility independence to return to [facility], decrease caregiver burden and improve quality of life  The patient's AM-PAC Basic Mobility Inpatient Short Form Raw Score is 13  A Raw score of less than or equal to 17 suggests the patient may benefit from discharge to post-acute rehabilitation services   Please also refer to the recommendation of the Physical Therapist for safe discharge planning  Barriers to Discharge Inaccessible home environment;Decreased caregiver support  (Pt lives alone, 4+1 KATHY)   Goals   Patient Goals to get better   STG Expiration Date 08/30/22   Plan   Treatment/Interventions ADL retraining;Functional transfer training;Elevations;LE strengthening/ROM; Endurance training; Therapeutic exercise;Patient/family training;Equipment eval/education; Bed mobility;Gait training; Compensatory technique education;Continued evaluation;OT   PT Frequency 4-6x/wk   Recommendation   PT Discharge Recommendation Post acute rehabilitation services   Equipment Recommended [facility] Recommended Wheeled walker   AM-PAC Basic Mobility Inpatient   Turning in Bed Without Bedrails 2   Lying on Back to Sitting on Edge of Flat Bed 2   Moving Bed to Chair 3   Standing Up From Chair 3   Walk in Room 2   Climb 3-5 Stairs 1   Basic Mobility Inpatient Raw Score 13   Basic Mobility Standardized Score 33 99   Turning Head Towards Sound 4   Follow Simple Instructions 4   Low Function Basic Mobility Raw Score 21   Low Function Basic Mobility Standardized Score 34 39   Highest Level Of Mobility   JH-HLM Goal 4: Move to chair/commode   JH-HLM Achieved 4: Move to chair/commode     Rachna Knight, PT      Patient Name: Romana Collazo  FMDCF'T Date: 8/17/2022

## 2022-08-17 NOTE — PROGRESS NOTES
PHYSICAL MEDICINE AND REHABILITATION   PREADMISSION ASSESSMENT     Projected [facility] Diagnoses:  Impairment of mobility, safety and Activities of Daily Living (ADLs) due to Orthopedic Disorders:  08 11  Unilateral Hip Fracture  Etiologic: Closed displaced intertrochanteric fracture of right femur    Date of Onset: 08/14/2022   Date of surgery: 08/15/2022    PATIENT INFORMATION  Name: Nelida Solano Phone #: [phone] (home)   Address: [address] [place] [address] Drive [address]  YOB: 1940 Age: 78 y o  SS#   Marital Status:   Ethnicity: White  Employment Status: retired  Extended Emergency Contact Information  Primary Emergency Contact: Nixon Nieveshan Phone: [phone]  Relation: Child  Secondary Emergency Contact: Mari Dominguez  Mobile Phone: [phone]  Relation: None  Advance Directive: Level 1 Full Code  INSURANCE/COVERAGE:     Primary Payor: [facility] REP / Plan: [facility] REP / Product Type: Medicare PPO /   Secondary Payer:<NONE>   Payer Contact:  Payer Contact:   Contact Phone:  Contact Phone:     Authorization #:   Coverage Dates:  LCD:   MEDICARE #: N/A  Medicare Days: N/A  Medical Record #: [id number]    REFERRAL SOURCE:   Referring provider: Dom Pantoja MD  Referring facility: [address] [place] [address]  Room: /-01  PCP: Pattie Ward DO PCP phone number: [phone]    MEDICAL INFORMATION  HPI: Nelida Solano is a 78 y o  female with a PMH of HTN and hypothyroidism who presented to [facility] with right hip pain s/p mechanical fall  Pt reports she was watering her lawn when the hose became stuck on something and when she pulled on it she lost her balance falling on to her right hip  She experienced immediate pain and was unable to bear weight on it  The patient her neighbor helped her up and gave her an aspirin for the pain   She also reported having a small abrasion to her elbow but no significant associated pain  She has otherwise been in her normal states of health and is following with pulmonology closely for a lung nodule found recently  No dyspnea or cough upon presentation the the ED  Imaging taken on 8/14/2022 indicated comminuted right intratrochanteric fracture or the R femur  Findings from chest xray on 8/14/2022 indicated cardiomediastinal silhouette appearing enlarged  No evidence of heart failure chronic changes present  Nodule again noted in the right mid-lung and no pneumothorax or pleural effusion  No acute cardiopulmonary disease  Stable nodule in the right mid-lung  CT scan taken without contrast of the brain indicated no intracranial mass, mass effect or midline shift  No signs of infarction  No acute parenchymal hemorrhage  Xray of R shoulder indicated osseous structures appearing within normal limits for the patients age and calcific tendinitis in the right shoulder  Orthopedics were consulted and fount the patient in need for surgery do to noted fracture  Surgery was completed on 8/15/2022  Insertion nail IM Femur antegrade (Trochanteric) (Right)  Hemoglobin noted to have dropped post surgery from 11 3 to 9 1 8/17/2022  Internal medicine has indicated need for follow up lab work with in 2-3 days, reporting hemoglobin is currently stable  PT/OT therapies were consulted and continue to follow patient at this time and are recommending inpatient acute rehab when medically stable  All involved medical disciplines feel/agree patient is medically ready for discharge at this time  Inpatient acute rehabilitation physician was consulted  Upon review of patient's case and correspondence with PT/OT therapy services, [facility] physician amy Kessler will benefit and is a good candidate/appropriate for inpatient acute rehab at this time  Magen Kessler has demonstrated the willingness/desire and tolerance to participate in the required 3 hours or more of therapies per day               Past Medical History:   Past Surgical History: Allergies:     Past Medical History:   Diagnosis Date    Arthritis     Disease of thyroid gland     Hypertension     Past Surgical History:   Procedure Laterality Date    CA OPEN RX FEMUR FX+INTRAMED VIKI Right 8/15/2022    Procedure: INSERTION NAIL IM FEMUR ANTEGRADE (TROCHANTERIC); Surgeon: Magen Galaviz MD;  Location:  MAIN OR;  Service: Orthopedics     Allergies   Allergen Reactions    Sulfur-Salicylic Acid [Salicylic Acid-Sulfur] Swelling         Medical/functional conditions requiring inpatient rehabilitation: impaired balance, impaired mobility, impaired self care     Risk for medical/clinical complications: risk for falls, risk for hypotensive/hypertensive episodes    Comorbidities/Surgeries in the last 100 days:  Symptomatic varicose veins of both lower extremities    Femur fracture, right (HCC)    Primary hypertension    Acquired hypothyroidism     CURRENT VITAL SIGNS:   Temp:  [98 °F (36 7 °C)-98 4 °F (36 9 °C)] 98 4 °F (36 9 °C)  HR:  [] 105  Resp:  [18] 18  BP: (134-141)/(65-70) 134/65   Intake/Output Summary (Last 24 hours) at 8/17/2022 1401  Last data filed at 8/17/2022 1347  Gross per 24 hour   Intake 1200 ml   Output 750 ml   Net 450 ml        LABORATORY RESULTS:      Lab Results   Component Value Date    HGB 9 1 (L) 08/17/2022    HCT 29 0 (L) 08/17/2022    WBC 8 50 08/17/2022     Lab Results   Component Value Date    BUN 21 08/17/2022    K 4 2 08/17/2022     08/17/2022    CREATININE 0 71 08/17/2022     Lab Results   Component Value Date    PROTIME 13 8 08/15/2022    INR 0 99 08/15/2022        DIAGNOSTIC STUDIES:  XR chest portable    Result Date: 8/15/2022  Impression: No acute cardiopulmonary disease  Stable nodule in the right midlung  Please see CT report of 07/05/2022  Workstation performed: [id number]     XR elbow 3+ vw right    Result Date: 8/15/2022  Impression: No acute osseous abnormality   Workstation performed: [id number]     XR hip/pelv 2-3 vws right if performed    Result Date: 8/15/2022  Impression: Comminuted right intratrochanteric fracture  Workstation performed: [id number]     XR femur 2 vw right    Result Date: 8/15/2022  Impression: Fluoroscopic guidance provided for procedure guidance  Please refer to the separate procedure notes for additional details  Workstation performed: [id number]     CT head wo contrast    Result Date: 8/14/2022  Impression: No acute intracranial abnormality   Workstation performed: [id number]       PRECAUTIONS/SPECIAL NEEDS:  Weight Bearing Precautions:  weight bearing as tolerated, Anticoagulation:  Lovenox, Edema Management, Safety Concerns, Pain Management and Language Preference: english    MEDICATIONS:     Current Facility-Administered Medications:     acetaminophen (TYLENOL) tablet 975 mg, 975 mg, Oral, Q8H [facility], Ben Mesa PA-C, 975 mg at 08/17/22 1319    ascorbic acid (VITAMIN C) tablet 500 mg, 500 mg, Oral, Daily, Chato De León MD, 500 mg at 08/17/22 0836    enoxaparin (LOVENOX) subcutaneous injection 40 mg, 40 mg, Subcutaneous, Daily, Ben Mesa PA-C, 40 mg at 08/17/22 5259    ferrous sulfate tablet 325 mg, 325 mg, Oral, After Breakfast, Chato De León MD, 325 mg at 08/17/22 0836    HYDROmorphone (DILAUDID) injection 0 2 mg, 0 2 mg, Intravenous, Q4H PRN, Ben Mesa PA-C    lisinopril (ZESTRIL) tablet 5 mg, 5 mg, Oral, Once, Ben Mesa PA-C    methocarbamol (ROBAXIN) tablet 500 mg, 500 mg, Oral, Q6H PRN, Ben Mesa PA-C, 500 mg at 08/15/22 2113    ondansetron (ZOFRAN) injection 4 mg, 4 mg, Intravenous, Q6H PRN, Ben Mesa PA-C    oxyCODONE (ROXICODONE) IR tablet 2 5 mg, 2 5 mg, Oral, Q6H PRN **OR** oxyCODONE (ROXICODONE) IR tablet 5 mg, 5 mg, Oral, Q6H PRN, Ben Mesa PA-C, 5 mg at 08/16/22 0949    senna (SENOKOT) tablet 8 6 mg, 1 tablet, Oral, HS PRN, Ben Mesa PA-C    thyroid (ARMOUR) tablet 15 mg, 15 mg, Oral, Early Morning, 15 mg at 08/17/22 0501 **AND** thyroid (ARMOUR) tablet 120 mg, 120 mg, Oral, Early Morning, 120 mg at 08/17/22 0501 **AND** thyroid desiccated (ARMOUR) tablet 30 mg, 30 mg, Oral, Early Morning, Andrew Blanco PA-C, 30 mg at 08/17/22 0501    SKIN INTEGRITY:   no rashes, no erythema, no peripheral edema, Incision: no significant drainage    PRIOR LEVEL OF FUNCTION:  She lives in a(n) single family home  Romana Collazo is unmarried and lives alone  Self Care: Independent, Indoor Mobility: Independent, Stairs (in/outdoor): Independent and Cognition: Independent    FALLS IN THE LAST 6 MONTHS: 1-4    HOME ENVIRONMENT:  The living area: bedroom on 1st floor and bathroom on 1st floor  There are 4 to enter the home  The patient will not have 24 hour supervision/physical assistance available upon discharge  Patient does have help from family and neighbors  PREVIOUS DME:  Equipment in home (previous DME): None    FUNCTIONAL STATUS: PT/OT  Physical Therapy Occupational Therapy Speech Therapy    08/16/22 1050   PT Last Visit   PT Visit Date 08/16/22   Note Type   Note type Evaluation   Pain Assessment   Pain Assessment Tool 0-10   Pain Score 10 - Worst Possible Pain   Pain Location/Orientation Orientation: Right;Location: Hip  (chronic arthritis in whole body)   Hospital Pain Intervention(s) Ambulation/increased activity   Restrictions/Precautions   Weight Bearing Precautions Per Order Yes   RLE Weight Bearing Per Order WBAT   Other Precautions Fall Risk;Pain;Bed Alarm; Chair Alarm   Home Living   Type of Home House  (4+1 KATHY)   Home Layout Stairs to enter with rails; One level   Bathroom Shower/Tub Tub/shower unit   Bathroom Equipment Grab bars in shower   Additional Comments Does not own any DME/AD   Prior Function   Level of [place] Independent with ADLs and functional mobility   Lives With Alone   Receives Help From Family   ADL Assistance Independent   IADLs Independent   Falls in the last 6 months 1 to 4   Vocational Retired Comments (+) Drives, grocery shops, cooks & cleans; Pt sleeps in recliner chair 2/2 arthritis  General   Family/Caregiver Present No   Cognition   Overall Cognitive Status WFL   Arousal/Participation Alert   Orientation Level Oriented X4   Memory Within functional limits   Following Commands Follows all commands and directions without difficulty   Subjective   Subjective "I am leaking all the time "   RUE Assessment   RUE Assessment WFL   LUE Assessment   LUE Assessment WFL   RLE Assessment   RLE Assessment    (1/5 hip flex/abd/add;  2-/5 knee extension, 3/5 df)   LLE Assessment   LLE Assessment WFL   Bed Mobility   Supine to Sit 2  Maximal assistance   Additional items Assist x 2   Additional Comments Pt remains OOB in chair at end of session;  pt sleeps in recliner chair at baseline 2/2 arthritis  Transfers   Sit to Stand 3  Moderate assistance   Additional items Assist x 2; Increased time required   Stand to Sit 4  Minimal assistance   Additional items Assist x 2;Armrests; Increased time required   Stand pivot 3  Moderate assistance   Additional items Assist x 2; Increased time required;Verbal cues  (RW)   Ambulation/Elevation   Gait pattern Decreased foot clearance; Forward Flexion;Decreased R stance   Gait Assistance 3  Moderate assist   Additional items Assist x 2   Assistive Device Rolling walker   Distance 2ft bed to chair, forward flexed posture;  pt states she does not stand upright at baseline 2/2 arthritis, forward head  Balance   Static Sitting Fair -   Dynamic Sitting Fair -   Static Standing Poor +  (RW)   Dynamic Standing Poor +  (RW)   Ambulatory Poor +  (RW)   Activity Tolerance   Activity Tolerance Patient limited by fatigue;Patient limited by pain   Nurse Made Aware Alicia Bark   Assessment   Prognosis Fair   Problem List Decreased strength;Decreased range of motion;Decreased endurance; Impaired balance;Decreased mobility; Decreased coordination;Orthopedic restrictions;Decreased skin integrity;Pain Assessment Pt is an 78 y o  female who presented to ED 8/14/22 with c/o R hip pain after fall while watering lawn  Dx:  R Displaced Femur fx;  8/15/22: IM nail of R femur; Comorbidities affecting pt's physical performance at time of assessment include: arthritis, thyroid disease, HTN  Personal factors affecting pt at time of IE include: pain, arthritis  PLOF and home set up listed above;  concerns for return home include but are not limited to physical assist, elevations, unable to care for self, pain  Upon evaluation: Pt requires max A x 2 for bed mobility, mod A x 2 for sit to stand, and mod A x 2 for stepping to chair with RW  Full objective findings from PT assessment regarding body systems outlined above  Current limitations include limited RLE ROM and strength, impaired balance, decreased endurance/activity tolerance, gait deviations, fall risk and pain  The following objective measures performed on IE also reveal limitations: urinary incontinence  Pt's clinical presentation is currently unstable/unpredictable seen in pt's presentation of continuous monitoring in hospital, fall risk, pain, urinary incontinence  Pt would benefit from continued PT while in hospital and follow up with inpt rehab at D/C to increase strength, balance, endurance, independence with funcitonal mobility to return to PLOF, maximize independence, decrease caregiver burden and improve quality of life  The patient's AM-PAC Basic Mobility Inpatient Short Form Raw Score is 10  A Raw score of less than or equal to 17 suggests the patient may benefit from discharge to post-acute rehabilitation services  Please also refer to the recommendation of the Physical Therapist for safe discharge planning         08/16/22 1030   OT Last Visit   OT Visit Date 08/16/22   Note Type   Note type Evaluation   Restrictions/Precautions   Weight Bearing Precautions Per Order Yes   RLE Weight Bearing Per Order WBAT   Other Precautions O2;Fall Risk;Pain;Multiple lines   Pain Assessment   Pain Assessment Tool 0-10   Pain Score 10 - Worst Possible Pain   Pain Location/Orientation Orientation: Right;Location: Hip  (& chronic arthritis)   Patient's Stated Pain Goal No pain   Hospital Pain Intervention(s) Ambulation/increased activity;Repositioned   Home Living   Type of Home House  (4+1 KATHY)   Home Layout Stairs to enter with rails; One level   Bathroom Shower/Tub Tub/shower unit   Bathroom Equipment Grab bars in shower   Additional Comments Does not own any DME/AD   Prior Function   Level of [place] Independent with ADLs and functional mobility   Lives With Alone   Receives Help From Family; [address] in the last 6 months 1 to 4   Comments (+) Drives, grocery shops, cooks & cleans   Lifestyle   Autonomy Very independent at baseline with all ADL/IADLs   Reciprocal Relationships Neighbor & sons   Intrinsic Gratification Gardening, Newser   Subjective   Subjective "I'm leaking!!"   ADL   Eating Assistance 7  Independent   Grooming Assistance 5  Supervision/Setup   UB Bathing Assistance 5  Supervision/Setup   LB Bathing Assistance 3  Moderate Assistance   UB Dressing Assistance 5  Supervision/Setup   LB Dressing Assistance 2  Maximal [address]  2  Maximal Assistance   Bed Mobility   Supine to Sit 2  Maximal assistance   Additional items Assist x 2; Increased time required;Verbal cues;LE management;HOB elevated   Additional Comments Pt remains OOB to recliner at end of session with chair alarm on   Transfers   Sit to Stand 3  Moderate assistance   Additional items Assist x 2; Increased time required;Verbal cues;HOB elevated   Stand to Sit 4  Minimal assistance   Additional items Assist x 2;Armrests; Verbal cues; Increased time required   Stand pivot 3  Moderate assistance   Additional items Assist x 2; Increased time required;Verbal cues;Armrests  (RW)   Balance   Static Sitting Fair -   Dynamic Sitting Fair -   Static Standing Poor +   Dynamic Standing Poor +   Ambulatory Poor +   Activity Tolerance   Activity Tolerance Patient limited by fatigue;Patient limited by pain   Medical Staff Made Aware PT Siobhan   Nurse Made Aware RN Arlester Gitelman Assessment   RUE Assessment WFL   LUE Assessment   LUE Assessment WFL   Cognition   Overall Cognitive Status WFL   Arousal/Participation Alert   Attention Within functional limits   Orientation Level Oriented X4   Memory Within functional limits   Following Commands Follows one step commands without difficulty   Assessment   Limitation Decreased ADL status; Decreased Safe judgement during ADL;Decreased endurance;Decreased self-care trans;Decreased high-level ADLs   Prognosis Good   Assessment Pt is a 78 y o  female seen for OT evaluation at [facility], admitted 8/14/2022 w/ Femur fracture, right ([address] )  Pt is now s/p insertion nail IM femur with RLE WBAT orders  OT completed extensive review of pt's medical and social history  Comorbidities affecting pt's functional performance at time of assessment include: HTN, hypothyroidism, symptomatic varicose veins of BLE, arthritis (pt self-report)  Personal factors affecting pt at time of IE include: steps to enter environment, limited home support, behavioral pattern, difficulty performing ADLS, difficulty performing IADLS , decreased initiation and engagement  and environment  Prior to admission, pt was living alone in a [facility] with 4+1 KATHY  Pt was I w/  ADLS and IADLS, (+) drove, & required use of no DME/AD PTA  Upon evaluation: Pt requires Max Ax2 for bed mobility, Mod Ax2 for functional mobility/transfers, S for UB ADLs and Max Ax2 for LB ADLS 2* the following deficits impacting occupational performance: weakness, decreased balance, decreased tolerance, increased pain and decreased coping skills  Full objective findings from OT assessment regarding body systems outlined above   Pt to benefit from continued skilled OT tx while in the hospital to address deficits as defined above and maximize level of functional independence w/ ADL's and functional mobility  Occupational Performance areas to address include: bathing/shower, toilet hygiene, dressing, medication management, functional mobility, community mobility and clothing management  Based on findings, pt is of high complexity  The patient's raw score on the AM-PAC Daily Activity inpatient short form is 18, standardized score is 38 66, less than 39 4  Patients at this level are likely to benefit from DC to post-acute rehabilitation services, which does coincide with current above OT recommendations  However, please refer to therapist recommendation for discharge planning given other factors that may influence destination  At this time, OT recommendations at time of discharge are STR  CARE SCORES:  Self Care:  Eatin: Setup or clean-up assistance  Oral hygiene: 05: Setup or clean-up assistance  Toilet hygiene: 02: Substantial/maximal assistance  Shower/bathing self: 03: Partial/moderate assistance  Upper body dressin: Partial/moderate assistance  Lower body dressin: Substantial/maximal assistance  Putting on/taking off footwear: 02: Substantial/maximal assistance  Transfers:  Roll left and right: 03: Partial/moderate assistance  Sit to lyin: Partial/moderate assistance  Lying to sitting on side of bed: 02:  Substantial/maximal assistance  Sit to stand: 03: Partial/moderate assistance  Chair/bed to chair transfer: 03: Partial/moderate assistance  Toilet transfer: 03: Partial/moderate assistance  Mobility:  Walk 10 ft: 03: Partial/moderate assistance  Walk 50 ft with two turns: 88: Not attempted due to medical conditions or safety concerns  Walk 150ft: 88: Not attempted due to medical conditions or safety concerns    CURRENT GAP IN FUNCTION  Prior to Admission: Functional Status: Patient was independent with mobility/ambulation, transfers, ADL's, IADL's  Estimated length of stay: 10 to 14 days    Anticipated Post-Discharge Disposition/Treatment  Disposition: Return to previous home/apartment  Outpatient Services: PT    BARRIERS TO DISCHARGE  Lovenox, Pain, Balance Difficulty, Home Accessibility, Equipment Needs and Lives Alone    INTERVENTIONS FOR DISCHARGE  Adaptive equipment, Patient/Family/Caregiver Education, [employer], Arrange DME needs, Therapy exercises and Center of balance support     REQUIRED THERAPY:  Patient will require PT and OT 90 minutes each per day, five days per week to achieve rehab goals  REQUIRED FUNCTIONAL AND MEDICAL MANAGEMENT FOR INPATIENT REHABILITATION:  Pain Management: Overall pain is moderately controlled, Deep Vein Thrombosis (DVT) Prophylaxis:  lovenox  Nursing education and bowel/bladder management, internal medicine to manage/monitor medical conditions, PM&R to maximize function and provide medical oversight, PT/OT intervention, patient/family education/training, and any consults as needed  RECOMMENDED LEVEL OF CARE:   Leona Pacheco is a 78 y o  female with a PMH of HTN and hypothyroidism who presented to [facility] with right hip pain s/p mechanical fall  Pt reports she was watering her lawn when the hose became stuck on something and when she pulled on it she lost her balance falling on to her right hip  She experienced immediate pain and was unable to bear weight on it  The patient her neighbor helped her up and gave her an aspirin for the pain  She also reported having a small abrasion to her elbow but no significant associated pain  She has otherwise been in her normal states of health and is following with pulmonology closely for a lung nodule found recently  No dyspnea or cough upon presentation the the ED  Imaging taken on 8/14/2022 indicated comminuted right intratrochanteric fracture or the R femur   Findings from chest xray on 8/14/2022 indicated cardiomediastinal silhouette appearing enlarged  No evidence of heart failure chronic changes present  Nodule again noted in the right mid-lung and no pneumothorax or pleural effusion  No acute cardiopulmonary disease  Stable nodule in the right mid-lung  CT scan taken without contrast of the brain indicated no intracranial mass, mass effect or midline shift  No signs of infarction  No acute parenchymal hemorrhage  Xray of R shoulder indicated osseous structures appearing within normal limits for the patients age and calcific tendinitis in the right shoulder  Orthopedics were consulted and fount the patient in need for surgery do to noted fracture  Surgery was completed on 8/15/2022  Insertion nail IM Femur antegrade (Trochanteric) (Right)  Hemoglobin noted to have dropped post surgery from 11 3 to 9 1 8/17/2022  Internal medicine has indicated need for follow up lab work with in 2-3 days, reporting hemoglobin is currently stable  PT/OT therapies were consulted and continue to follow patient at this time and are recommending inpatient acute rehab when medically stable  All involved medical disciplines feel/agree patient is medically ready for discharge at this time  Inpatient acute rehabilitation physician was consulted  Upon review of patient's case and correspondence with PT/OT therapy services, [facility] physician fees Monico Bravo will benefit and is a good candidate/appropriate for inpatient acute rehab at this time  Monico Bravo has demonstrated the willingness/desire and tolerance to participate in the required 3 hours or more of therapies per day  Prior to admission / hospital stay Monico Bravo was I with all ADLs /functional mobility / iADLs   Currently with PT therapies: Currently with OT therapies: Nursing is being recommended for medication distribution / management, bowel / bladder management and educational purposes, internal medicine to continue to monitor and manage medical conditions, PM&R to maximize function and provide medical oversight, and inpatient rehab to maximize self-care, mobility, strength, and endurance upon discharge to home

## 2022-08-17 NOTE — DISCHARGE SUMMARY
[place]  Discharge- Pankaj Castañeda 1940, 78 y o  female MRN: [id number]  Unit/Bed#: -01 Encounter: [id number]  Primary Care Provider: Nilesh Hoffmann DO   Date and time admitted to hospital: 8/14/2022  8:30 PM    * Femur fracture, right [facility])  Assessment & Plan  S/p mechanical fall on to right hip  XR with right femur fracture that is displaced  No head strike, LOC, or other injury  · NV intact on admission  · POD #2 - S/P right long TFNA  · Orthopedic surgery following:  · WBAT to RLE with assistance  · DVT prophylaxis: Lovenox and mechanical  Recommend  mg BID x 6 weeks at discharge  · Ortho stable for discharge to rehab  Follow up as outpatient with Dr Cedrick Gatica in 10-14 days    · Pain control with tylenol, oxycodone, robaxin  · PT/OT recommending acute rehab, CM on board for placement  · Encourage incentive spirometer use  · Wound care and dressing changes per Orthopedics    Acute blood loss anemia  Assessment & Plan  · Acute postoperative blood-loss anemia  · Hgb drop from 11 3->9 1 today, patient asymptomatic  · [facility]  · On iron and vitamin-C    Acquired hypothyroidism  Assessment & Plan  · Home regimen: armour thryoid 165mg daily   · Continue with pt home supply     Primary hypertension  Assessment & Plan  · Home regimen: lisinopril 5mg BID  · Monitor vitals as per protocol, BP reviewed and acceptable        Medical Problems             Resolved Problems  Date Reviewed: 8/17/2022   None               Discharging Physician / Practitioner: Dany Lema PA-C  PCP: Nilesh Hoffmann DO  Admission Date:   Admission Orders (From admission, onward)     Ordered        08/14/22 2139  INPATIENT ADMISSION  Once                      Discharge Date: 08/17/22    Consultations During Hospital Stay:  · ***    Procedures Performed:   · ***    Significant Findings / Test Results:   · ***    Incidental Findings:   · ***     Test Results Pending at Discharge (will require follow up):   · ***     Outpatient Tests Requested:  · ***    Complications:  ***    Reason for Admission: ***    Hospital Course:   Romana Collazo is a 78 y o  female patient who originally presented to the hospital on 8/14/2022 due to ***    {Complete this smartphrase if the patient is an inpatient and being discharged earlier than 2 midnights  If this does not apply, please delete this line:93725}    Please see above list of diagnoses and related plan for additional information  Condition at Discharge: {Condition:73628}    Discharge Day Visit / Exam:   Subjective:  ***  Vitals: Blood Pressure: 134/65 (08/17/22 0707)  Pulse: 105 (08/17/22 0707)  Temperature: 98 4 °F (36 9 °C) (08/17/22 0707)  Temp Source: Temporal (08/15/22 1051)  Respirations: 18 (08/16/22 1513)  Weight - Scale: 66 6 kg (146 lb 13 2 oz) (08/14/22 2037)  SpO2: 92 % (08/17/22 0707)  Exam:   Physical Exam ***    Discussion with Family: {Family Communication:06672}    Discharge instructions/Information to patient and family:   See after visit summary for information provided to patient and family  Provisions for Follow-Up Care:  See after visit summary for information related to follow-up care and any pertinent home health orders  Disposition:   {Disposition on Discharge:44893}    Planned Readmission: ***     Discharge Statement:  I spent *** minutes discharging the patient  This time was spent on the day of discharge  I had direct contact with the patient on the day of discharge  Greater than 50% of the total time was spent examining patient, answering all patient questions, arranging and discussing plan of care with patient as well as directly providing post-discharge instructions  Additional time then spent on discharge activities  Discharge Medications:  See after visit summary for reconciled discharge medications provided to patient and/or family        **Please Note: This note may have been constructed using a voice recognition system**

## 2022-08-17 NOTE — PROGRESS NOTES
[place]  Progress Note - Jean Claude Padron 1940, 78 y o  female MRN: [id number]  Unit/Bed#: -01 Encounter: [id number]  Primary Care Provider: Liborio Clifton DO   Date and time admitted to hospital: 8/14/2022  8:30 PM    * Femur fracture, right [facility])  Assessment & Plan  S/p mechanical fall on to right hip  XR with right femur fracture that is displaced  No head strike, LOC, or other injury  · NV intact on admission  · POD #2 - S/P right long TFNA  · Orthopedic surgery following:  · WBAT to RLE with assistance  · DVT prophylaxis: Lovenox and mechanical  Recommend  mg BID x 6 weeks at discharge  · Ortho stable for discharge to rehab  Follow up as outpatient with Dr Mariel Jimenez in 10-14 days  · Pain control with tylenol, oxycodone, robaxin  · PT/OT recommending acute rehab, CM on board for placement  · Encourage incentive spirometer use  · Wound care and dressing changes per Orthopedics    Acute blood loss anemia  Assessment & Plan  · Acute postoperative blood-loss anemia  · Hgb drop from 11 3->9 1 today, patient asymptomatic  · Check CBC in 2-3 days on [facility] to [facility]  · On iron and vitamin-C    Acquired hypothyroidism  Assessment & Plan  · Home regimen: armour thryoid 165mg daily   · Continue with pt home supply     Primary hypertension  Assessment & Plan  · Home regimen: lisinopril 5mg BID  · Monitor vitals as per protocol, BP reviewed and acceptable      VTE Pharmacologic Prophylaxis: VTE Score: 8 High Risk (Score >/= 5) - Pharmacological DVT Prophylaxis Ordered: enoxaparin (Lovenox)  Sequential Compression Devices Ordered  Patient Centered Rounds: I performed bedside rounds with nursing staff today  Discussions with Specialists or Other Care Team Provider:  Case management    Education and Discussions with Family / Patient: Attempted to update  (daughter) via phone  Unable to contact  Time Spent for Care: 45 minutes   More than 50% of total time spent on counseling and coordination of care as described above  Current Length of Stay: 3 day(s)  Current Patient Status: Inpatient   Certification Statement: The patient will continue to require additional inpatient hospital stay due to Awaiting rehab placement  Discharge Plan: Anticipate discharge later today or tomorrow to rehab facility  Code Status: Level 1 - Full Code    Subjective:   Patient is seen at bedside this a m , denies lightheadedness, dizziness, fatigue, denies active bleeding  Patient states that pain is well controlled with current pain regimen  Objective:     Vitals:   Temp (24hrs), Av 2 °F (36 8 °C), Min:98 °F (36 7 °C), Max:98 4 °F (36 9 °C)    Temp:  [98 °F (36 7 °C)-98 4 °F (36 9 °C)] 98 4 °F (36 9 °C)  HR:  [] 105  Resp:  [18] 18  BP: (134-141)/(65-70) 134/65  SpO2:  [92 %-96 %] 96 %  Body mass index is 23 34 kg/m²  Input and Output Summary (last 24 hours): Intake/Output Summary (Last 24 hours) at 2022 1408  Last data filed at 2022 1347  Gross per 24 hour   Intake 1200 ml   Output 750 ml   Net 450 ml       Physical Exam:   Physical Exam  Constitutional:       General: She is not in acute distress  Appearance: Normal appearance  She is not ill-appearing, toxic-appearing or diaphoretic  Cardiovascular:      Rate and Rhythm: Normal rate and regular rhythm  Pulses: Normal pulses  Heart sounds: Normal heart sounds  Pulmonary:      Effort: Pulmonary effort is normal  No respiratory distress  Breath sounds: Normal breath sounds  Abdominal:      General: Bowel sounds are normal  There is no distension  Palpations: Abdomen is soft  Tenderness: There is no abdominal tenderness  Musculoskeletal:         General: Tenderness present  No swelling  Comments: Tenderness to palpation of lateral aspect of right thigh, no surrounding ecchymosis, erythema or edema  ROM limited by pain  Neurovascularly intact     Skin:     General: Skin is warm  Neurological:      General: No focal deficit present  Mental Status: She is alert  Psychiatric:         Mood and Affect: Mood normal          Behavior: Behavior normal           Additional Data:     Labs:  Results from last 7 days   Lab Units 08/17/22  0456   WBC Thousand/uL 8 50   HEMOGLOBIN g/dL 9 1*   HEMATOCRIT % 29 0*   PLATELETS Thousands/uL 126*   NEUTROS PCT % 77*   LYMPHS PCT % 11*   MONOS PCT % 11   EOS PCT % 1     Results from last 7 days   Lab Units 08/17/22  0456   SODIUM mmol/L 143   POTASSIUM mmol/L 4 2   CHLORIDE mmol/L 106   CO2 mmol/L 32   BUN mg/dL 21   CREATININE mg/dL 0 71   ANION GAP mmol/L 5   CALCIUM mg/dL 8 7   GLUCOSE RANDOM mg/dL 122     Results from last 7 days   Lab Units 08/15/22  0527   INR  0 99                   Lines/Drains:  Invasive Devices  Report    Peripheral Intravenous Line  Duration           Peripheral IV 08/15/22 Dorsal (posterior); Left Forearm 1 day          Drain  Duration           External Urinary Catheter 2 days                  Imaging: No pertinent imaging reviewed      Recent Cultures (last 7 days):         Last 24 Hours Medication List:   Current Facility-Administered Medications   Medication Dose Route Frequency Provider Last Rate    acetaminophen  975 mg Oral [place], [place]      ascorbic acid  500 mg Oral Daily Jabier Pnea MD      enoxaparin  40 mg Subcutaneous Daily Ryan Oquendo PA-C      ferrous sulfate  325 mg Oral After Breakfast Jabier Pena MD      HYDROmorphone  0 2 mg Intravenous Q4H PRN Ryan Oquendo PA-C      lisinopril  5 mg Oral Once Ryan Oquendo PA-C      methocarbamol  500 mg Oral Q6H PRN Ryan Oquendo PA-C      ondansetron  4 mg Intravenous Q6H PRN Ryan Oquendo PA-C      oxyCODONE  2 5 mg Oral Q6H PRN Ryan Oquendo PA-C      Or    oxyCODONE  5 mg Oral Q6H PRN Ryan Oquendo PA-C      senna  1 tablet Oral HS PRN Ryan Oquendo PA-C      thyroid  15 mg Oral Early Morning Ryan Oquendo PA-C      And    thyroid 120 mg Oral Early Morning Ryan Oquendo PA-C      And    thyroid desiccated  30 mg Oral Early Morning Ryan Oquendo PA-C          Today, Patient Was Seen By: Peng Yu PA-C    **Please Note: This note may have been constructed using a voice recognition system  **

## 2022-08-17 NOTE — CASE MANAGEMENT
Case Management Progress Note    Patient name Bere Walker  Location [address] [id number]/-[id number] MRN [id number]  : 1940 Date 2022       LOS (days): 3  Geometric Mean LOS (GMLOS) (days): 4 30  Days to GMLOS:1 5        OBJECTIVE:        Current admission status: Inpatient  Preferred Pharmacy:   [address] [place] [address] [id number] [place] [id number]  Phone: [phone] Fax: [phone]    Primary Care Provider: Christine Haley DO    Primary Insurance: [facility]  Secondary Insurance:     PROGRESS NOTE:    Met with pt to discuss dc plan  CM discussed accepting facilities; [facility], [facility], and [facility]  CM informed pt that [facility] does not have a bed available  Pt is requesting [facility]  CM notified Oscar Aquino liaison who states she will submit for authorization  Tessa Zhang confirmed Chu Ceballos was submitted today

## 2022-08-17 NOTE — PROGRESS NOTES
Jere Salas  78 y o   female  MR#: [id number]  8/17/2022    Post-op days: 2  Extremity: right hip    Subjective:  Patient seen and examined this morning  Sitting up in a chair  No complaints at this time  She did well with physical therapy yesterday  Pain is well controlled  Denies any fevers, chills or sweats  Vitals:   Vitals:    08/17/22 0707   BP: 134/65   Pulse: 105   Resp:    Temp: 98 4 °F (36 9 °C)   SpO2: 92%       Exam:   A&O x 3 NAD  Right hip:  Mepilex dressings intact  Most proximal dressing has mild sanguinous drainage showing  Thigh swollen but compressible  Calf soft, nontender  Actively moving ankle and toes  NV intact    Labs:   WBC   Recent Labs     08/14/22  2043 08/15/22  0527 08/17/22  0456   WBC 6 10 7 11 8 50     H/H   Recent Labs     08/14/22  2043 08/15/22  0527 08/17/22  0456   HGB 13 3 11 3* 9 1*   /  Recent Labs     08/14/22  2043 08/15/22  0527 08/17/22  0456   HCT 41 8 35 8 29 0*     Sed Rate No results for input(s): SEDRATE in the last 72 hours  CRP No results for input(s): CRP in the last 72 hours  Assessment:   S/p right long TFNA    Plan:   WBAT to RLE with assistance  PT/OT  Encourage incentive spirometry  Pain control  DVT prophylaxis: Lovenox and mechanical  Recommend  mg BID x 6 weeks at discharge  ABLA: Hbg 9 1  Drop of 2 g from yesterday  Will continue to monitor vitals and H/H  Asymptomatic at this time  DC planning  Ortho stable for discharge to rehab  Follow up as outpatient with Dr Mary Medeiros in 10-14 days

## 2022-08-17 NOTE — PLAN OF CARE
Problem: PHYSICAL THERAPY ADULT  Goal: Performs mobility at highest level of function for planned discharge setting  See evaluation for individualized goals  Description: Treatment/Interventions: ADL retraining, Functional transfer training, LE strengthening/ROM, Elevations, Therapeutic exercise, Endurance training, Patient/family training, Equipment eval/education, Bed mobility, Compensatory technique education, Gait training, Spoke to nursing, Spoke to case management  Equipment Recommended: Felicia Bhatti       See flowsheet documentation for full assessment, interventions and recommendations  Note: Prognosis: Fair  Problem List: Decreased strength, Decreased range of motion, Decreased endurance, Impaired balance, Decreased mobility, Decreased coordination, Decreased skin integrity, Orthopedic restrictions, Pain  Assessment: Treatment consisted of reclined TE,balance training, ambulation training, safety awareness, fall prevention, endurance training to increase upright positions and functional mobility  Pt with improved mobility noted with A x 1 for all transfers and stepping this session, pt able to better participate in hygiene after urinating on BSC;  Continues to require assist for all aspects of mobility and is not safe for mod I home alone DC; Re-educated pt on not using purewick and getting to/from commode with assist for all toileting needs; Pt verbalizes understanding and white board updated  Pt would benefit from continued PT while in hospital and follow up with inpt rehab at DC to increase strength, balance, endurance, mobility independence to return to PLOF, decrease caregiver burden and improve quality of life  The patient's AM-PAC Basic Mobility Inpatient Short Form Raw Score is 13  A Raw score of less than or equal to 17 suggests the patient may benefit from discharge to post-acute rehabilitation services   Please also refer to the recommendation of the Physical Therapist for safe discharge planning  Barriers to Discharge: Inaccessible home environment, Decreased caregiver support (Pt lives alone, 4+1 KATHY)     PT Discharge Recommendation: Post acute rehabilitation services    See flowsheet documentation for full assessment

## 2022-08-17 NOTE — ASSESSMENT & PLAN NOTE
· Acute postoperative blood-loss anemia  · Hgb drop from 11 3->9 1 today, patient asymptomatic  · Check CBC in 2-3 days on discharge to [facility]  · On iron and vitamin-C

## 2022-08-17 NOTE — DISCHARGE SUMMARY
[place]  Discharge- Karen Corona 1940, 78 y o  female MRN: [id number]  Unit/Bed#: -01 Encounter: [id number]  Primary Care Provider: Lawanda Sevilla DO   Date and time admitted to hospital: 8/14/2022  8:30 PM    * Femur fracture, right [facility])  Assessment & Plan  S/p mechanical fall on to right hip  XR with right femur fracture that is displaced  No head strike, LOC, or other injury  · S/P right long TFNA on 8/15  · Orthopedic surgery following:  · WBAT to RLE with assistance  · DVT prophylaxis: Lovenox and mechanical while inpatient  Recommend  mg BID x 6 weeks at discharge  · Follow-up with Dr Luis Morin in 10-14 days    · Pain control well controlled with scheduled tylenol and low dose oxycodone prn   · PT/OT recommending acute rehab on discharge, patient agreeable; arranged via case management   · Encourage incentive spirometer use  · Wound care and dressing changes per Orthopedics    Acute blood loss anemia  Assessment & Plan  · Acute postoperative blood-loss anemia  · Hgb from 11 3 > 9 1 > 8 3 > 9 5 > 7 7 > 8 4 at time of d/c   · Mild tachycardia otherwise asymptomatic and hemodynamically stable   · Follow-up repeat H&H this afternoon to ensure stability prior to d/c   · Check CBC in 2-3 days on discharge to rehab   · On iron and vitamin-C    Primary hypertension  Assessment & Plan  · BP acceptable, monitor routinely   · Continue home dose lisinopril 5mg BID    Acquired hypothyroidism  Assessment & Plan  · Continue home regimen: armour thryoid 165mg daily     Medical Problems             Resolved Problems  Date Reviewed: 8/19/2022   None               Discharging Physician / Practitioner: Rosina Peguero PA-C  PCP: Lawanda Sevilla DO  Admission Date:   Admission Orders (From admission, onward)     Ordered        08/14/22 2139  INPATIENT ADMISSION  Once                      Discharge Date: 08/19/22    Consultations During Hospital Stay:  · Orthopedic surgery  · PT/OT    Procedures Performed:   · Insertion nail IM right femur 8/15    Significant Findings / Test Results:   XR femur 2 vw right   Final Result by Sharmaine Cabral MD (08/15 1329)      Fluoroscopic guidance provided for procedure guidance  Please refer to the separate procedure notes for additional details  Workstation performed: [id number]         XR elbow 3+ vw right   Final Result by Amy Guaman MD (11/33 4847)      No acute osseous abnormality  Workstation performed: [id number]         CT head wo contrast   Final Result by Mars Umanzor DO (08/14 2151)      No acute intracranial abnormality  Workstation performed: [id number]         XR hip/pelv 2-3 vws right if performed   Final Result by Lili Markham MD (08/15 0508)      Comminuted right intratrochanteric fracture  Workstation performed: [id number]         XR chest portable   Final Result by Amy Guaman MD (93/24 0685)      No acute cardiopulmonary disease  Stable nodule in the right midlung  Please see CT report of 07/05/2022  Workstation performed: [id number]             Incidental Findings:   · None     Test Results Pending at Discharge (will require follow up): · None     Outpatient Tests Requested:  · Follow-up with Orthopedic surgery outpatient in 2 weeks  · Monitor CBC on discharge to rehab    Complications:  None    Reason for Admission:  Right femur fracture    Hospital Course:   Karen Corona is a 78 y o  female patient, PMH hypothyroidism, HTN, who originally presented to the hospital on 8/14/2022 due to right femur fracture from mechanical fall  On arrival, x-ray revealed displaced right femur fracture, Orthopedic surgery consulted for surgical fixation, s/p IM nail insertion on 26/80 without complications    Postop, patient had acute blood loss anemia with hemoglobin drop, however patient remained asymptomatic without signs of active bleeding or need for PRBC transfusion  Recommend monitoring CBC 2-3 days following discharge  PT/OT recommending rehab  Patient accepted at [facility]  Recommend follow-up with Orthopedic surgery outpatient 2 weeks  Please see above list of diagnoses and related plan for additional information  Condition at Discharge: fair    Discharge Day Visit / Exam:   * Please refer to separate progress note for these details *    Discussion with Family: Patient declined call to   Discharge instructions/Information to patient and family:   See after visit summary for information provided to patient and family  Provisions for Follow-Up Care:  See after visit summary for information related to follow-up care and any pertinent home health orders  Disposition:   Other [place] at [facility]    Planned Readmission: no     Discharge Statement:  I spent 25 minutes discharging the patient  This time was spent on the day of discharge  I had direct contact with the patient on the day of discharge  Greater than 50% of the total time was spent examining patient, answering all patient questions, arranging and discussing plan of care with patient as well as directly providing post-discharge instructions  Additional time then spent on discharge activities  Discharge Medications:  See after visit summary for reconciled discharge medications provided to patient and/or family        **Please Note: This note may have been constructed using a voice recognition system**

## 2022-08-18 LAB
BASOPHILS # BLD AUTO: 0.02 THOUSANDS/ΜL (ref 0–0.1)
BASOPHILS NFR BLD AUTO: 0 % (ref 0–1)
EOSINOPHIL # BLD AUTO: 0.12 THOUSAND/ΜL (ref 0–0.61)
EOSINOPHIL NFR BLD AUTO: 2 % (ref 0–6)
ERYTHROCYTE [DISTWIDTH] IN BLOOD BY AUTOMATED COUNT: 14.6 % (ref 11.6–15.1)
HCT VFR BLD AUTO: 26.6 % (ref 34.8–46.1)
HCT VFR BLD AUTO: 30.5 % (ref 34.8–46.1)
HGB BLD-MCNC: 8.3 G/DL (ref 11.5–15.4)
HGB BLD-MCNC: 9.5 G/DL (ref 11.5–15.4)
IMM GRANULOCYTES # BLD AUTO: 0.02 THOUSAND/UL (ref 0–0.2)
IMM GRANULOCYTES NFR BLD AUTO: 0 % (ref 0–2)
LYMPHOCYTES # BLD AUTO: 0.8 THOUSANDS/ΜL (ref 0.6–4.47)
LYMPHOCYTES NFR BLD AUTO: 11 % (ref 14–44)
MCH RBC QN AUTO: 29.2 PG (ref 26.8–34.3)
MCHC RBC AUTO-ENTMCNC: 31.2 G/DL (ref 31.4–37.4)
MCV RBC AUTO: 94 FL (ref 82–98)
MONOCYTES # BLD AUTO: 0.69 THOUSAND/ΜL (ref 0.17–1.22)
MONOCYTES NFR BLD AUTO: 10 % (ref 4–12)
NEUTROPHILS # BLD AUTO: 5.37 THOUSANDS/ΜL (ref 1.85–7.62)
NEUTS SEG NFR BLD AUTO: 77 % (ref 43–75)
NRBC BLD AUTO-RTO: 0 /100 WBCS
PLATELET # BLD AUTO: 123 THOUSANDS/UL (ref 149–390)
PMV BLD AUTO: 10.7 FL (ref 8.9–12.7)
RBC # BLD AUTO: 2.84 MILLION/UL (ref 3.81–5.12)
T4 FREE SERPL-MCNC: 1.4 NG/DL (ref 0.76–1.46)
WBC # BLD AUTO: 7.02 THOUSAND/UL (ref 4.31–10.16)

## 2022-08-18 PROCEDURE — 84439 ASSAY OF FREE THYROXINE: CPT | Performed by: PHYSICIAN ASSISTANT

## 2022-08-18 PROCEDURE — 99024 POSTOP FOLLOW-UP VISIT: CPT | Performed by: PHYSICIAN ASSISTANT

## 2022-08-18 PROCEDURE — 85025 COMPLETE CBC W/AUTO DIFF WBC: CPT | Performed by: PHYSICIAN ASSISTANT

## 2022-08-18 PROCEDURE — 99232 SBSQ HOSP IP/OBS MODERATE 35: CPT | Performed by: PHYSICIAN ASSISTANT

## 2022-08-18 PROCEDURE — 85018 HEMOGLOBIN: CPT | Performed by: PHYSICIAN ASSISTANT

## 2022-08-18 PROCEDURE — 85014 HEMATOCRIT: CPT | Performed by: PHYSICIAN ASSISTANT

## 2022-08-18 RX ADMIN — LEVOTHYROXINE, LIOTHYRONINE 15 MG: 9.5; 2.25 TABLET ORAL at 05:07

## 2022-08-18 RX ADMIN — OXYCODONE HYDROCHLORIDE AND ACETAMINOPHEN 500 MG: 500 TABLET ORAL at 09:37

## 2022-08-18 RX ADMIN — LEVOTHYROXINE, LIOTHYRONINE 30 MG: 19; 4.5 TABLET ORAL at 05:07

## 2022-08-18 RX ADMIN — ACETAMINOPHEN 325MG 975 MG: 325 TABLET ORAL at 05:07

## 2022-08-18 RX ADMIN — ACETAMINOPHEN 325MG 975 MG: 325 TABLET ORAL at 15:09

## 2022-08-18 RX ADMIN — FERROUS SULFATE TAB 325 MG (65 MG ELEMENTAL FE) 325 MG: 325 (65 FE) TAB at 09:39

## 2022-08-18 RX ADMIN — ENOXAPARIN SODIUM 40 MG: 100 INJECTION SUBCUTANEOUS at 09:37

## 2022-08-18 RX ADMIN — ACETAMINOPHEN 325MG 975 MG: 325 TABLET ORAL at 21:37

## 2022-08-18 RX ADMIN — OXYCODONE HYDROCHLORIDE 2.5 MG: 5 TABLET ORAL at 16:08

## 2022-08-18 RX ADMIN — LEVOTHYROXINE, LIOTHYRONINE 120 MG: 76; 18 TABLET ORAL at 05:07

## 2022-08-18 NOTE — PROGRESS NOTES
[place]  Progress Note - Sara Briones 1940, 78 y o  female MRN: [id number]  Unit/Bed#: -01 Encounter: [id number]  Primary Care Provider: Conrado Pena DO   Date and time admitted to hospital: 8/14/2022  8:30 PM    * Femur fracture, right [facility])  Assessment & Plan  S/p mechanical fall on to right hip  XR with right femur fracture that is displaced  No head strike, LOC, or other injury  · NV intact on admission  · POD #3 - S/P right long TFNA  · Orthopedic surgery following:  · WBAT to RLE with assistance  · DVT prophylaxis: Lovenox and mechanical  Recommend  mg BID x 6 weeks at discharge  · Ortho stable for discharge to rehab  Follow up as outpatient with Dr Jen Sargent in 10-14 days  · Pain control with tylenol, oxycodone, robaxin  · PT/OT recommending acute rehab, CM on board for placement  Will require referrals placed to SNF  · Encourage incentive spirometer use  · Wound care and dressing changes per Orthopedics    Acute blood loss anemia  Assessment & Plan  · Acute postoperative blood-loss anemia  · Hgb from 11 3 > 9 1 > 8 3 > 9 5  · Check CBC in 2-3 days on discharge to [facility]  · On iron and vitamin-C    Acquired hypothyroidism  Assessment & Plan  · Home regimen: armour thryoid 165mg daily   · Appears that she is hyperthyroid on her current regimen with TSH of 0 010  · Assessing T4 prior to adjusting dosage    Primary hypertension  Assessment & Plan  · Home regimen: lisinopril 5mg BID  · Monitor vitals as per protocol, BP reviewed and acceptable        VTE Pharmacologic Prophylaxis: VTE Score: 8 High Risk (Score >/= 5) - Pharmacological DVT Prophylaxis Ordered: enoxaparin (Lovenox)  Sequential Compression Devices Ordered  Patient Centered Rounds: I performed bedside rounds with nursing staff today    Discussions with Specialists or Other Care Team Provider: case management    Education and Discussions with Family / Patient: Patient declined call to contact person  Time Spent for Care: 15 minutes  More than 50% of total time spent on counseling and coordination of care as described above  Current Length of Stay: 4 day(s)  Current Patient Status: Inpatient   Certification Statement: The patient will continue to require additional inpatient hospital stay due to placement needed  Discharge Plan: patient stable for discharge at any time, just awaiting placement    Code Status: Level 1 - Full Code    Subjective:   Patient reports that she feels well and is able to do more with her leg today  Is ready to leave the hospital and go to rehab  Objective:     Vitals:   Temp (24hrs), Av 5 °F (36 9 °C), Min:98 3 °F (36 8 °C), Max:98 7 °F (37 1 °C)    Temp:  [98 3 °F (36 8 °C)-98 7 °F (37 1 °C)] 98 3 °F (36 8 °C)  HR:  [] 90  Resp:  [16-18] 16  BP: (136-139)/(66-73) 139/66  SpO2:  [91 %-97 %] 97 %  Body mass index is 23 34 kg/m²  Input and Output Summary (last 24 hours): Intake/Output Summary (Last 24 hours) at 2022 1429  Last data filed at 2022 1317  Gross per 24 hour   Intake 780 ml   Output 525 ml   Net 255 ml       Physical Exam:   Physical Exam  Vitals and nursing note reviewed  Constitutional:       General: She is not in acute distress  Appearance: She is not ill-appearing  HENT:      Head: Normocephalic and atraumatic  Cardiovascular:      Rate and Rhythm: Normal rate and regular rhythm  Pulses: Normal pulses  Heart sounds: Normal heart sounds  Pulmonary:      Effort: Pulmonary effort is normal       Breath sounds: Normal breath sounds  Abdominal:      General: Bowel sounds are normal       Palpations: Abdomen is soft  Tenderness: There is no abdominal tenderness  There is no guarding  Hernia: No hernia is present  Musculoskeletal:      Right lower leg: No edema  Left lower leg: No edema        Comments: ROM improving of right leg, tender to palpation   Neurological:      General: No focal deficit present  Mental Status: She is alert and oriented to person, place, and time  Psychiatric:         Mood and Affect: Mood normal          Behavior: Behavior normal           Additional Data:     Labs:  Results from last 7 days   Lab Units 08/18/22  1256 08/18/22  0508   WBC Thousand/uL  --  7 02   HEMOGLOBIN g/dL 9 5* 8 3*   HEMATOCRIT % 30 5* 26 6*   PLATELETS Thousands/uL  --  123*   NEUTROS PCT %  --  77*   LYMPHS PCT %  --  11*   MONOS PCT %  --  10   EOS PCT %  --  2     Results from last 7 days   Lab Units 08/17/22  0456   SODIUM mmol/L 143   POTASSIUM mmol/L 4 2   CHLORIDE mmol/L 106   CO2 mmol/L 32   BUN mg/dL 21   CREATININE mg/dL 0 71   ANION GAP mmol/L 5   CALCIUM mg/dL 8 7   GLUCOSE RANDOM mg/dL 122     Results from last 7 days   Lab Units 08/15/22  0527   INR  0 99                   Lines/Drains:  Invasive Devices  Report    Peripheral Intravenous Line  Duration           Peripheral IV 08/15/22 Dorsal (posterior); Left Forearm 2 days          Drain  Duration           External Urinary Catheter 3 days                      Imaging: No pertinent imaging reviewed      Recent Cultures (last 7 days):         Last 24 Hours Medication List:   Current Facility-Administered Medications   Medication Dose Route Frequency Provider Last Rate    acetaminophen  975 mg Oral [place], [place]      ascorbic acid  500 mg Oral Daily Bishop Jagdeep MD      enoxaparin  40 mg Subcutaneous Daily [facility]      ferrous sulfate  325 mg Oral After Breakfast Bishop Jagdeep MD      HYDROmorphone  0 2 mg Intravenous Q4H PRN [facility], PA-C      lisinopril  5 mg Oral Once [facility]      methocarbamol  500 mg Oral Q6H PRN [facility], PA-C      ondansetron  4 mg Intravenous Q6H PRN [facility], PA-C      oxyCODONE  2 5 mg Oral Q6H PRN [facility], PA-C      Or    oxyCODONE  5 mg Oral Q6H PRN [facility], PA-C      senna  1 tablet Oral HS PRN [facility], PA-C      thyroid  15 mg Oral Early Morning Anastacia Blood, YESENIA      And    thyroid  120 mg Oral Early Morning Anastacia Blood, YESENIA      And    thyroid desiccated  30 mg Oral Early Morning Anastacia Blood, YESENIA          Today, Patient Was Seen By: Isaac Patterson PA-C    **Please Note: This note may have been constructed using a voice recognition system  **

## 2022-08-18 NOTE — CASE MANAGEMENT
Case Management Discharge Planning Note    Patient name Bere Walker  Location [address] 322/-33 MRN [id number]  : 1940 Date 2022       Current Admission Date: 2022  Current Admission Diagnosis:Femur fracture, right [facility])   Patient Active Problem List    Diagnosis Date Noted    Acute blood loss anemia 2022    Femur fracture, right ([address] ) 08/15/2022    Primary hypertension 08/15/2022    Acquired hypothyroidism 08/15/2022    Symptomatic varicose veins of both lower extremities 2021      LOS (days): 4  Geometric Mean LOS (GMLOS) (days): 4 30  Days to GMLOS:0 7     OBJECTIVE:  Risk of Unplanned Readmission Score: 9 68         Current admission status: Inpatient   Preferred Pharmacy:   [address] [place] [address] #[id number] [place] [address],[address] [place] [address]  Phone: [phone] Fax: [phone]    Primary Care Provider: Christine Haley DO    Primary Insurance: Maggy Garrett [facility]  Secondary Insurance:     DISCHARGE DETAILS:  Continuing to follow patient  Received TT from Pepe Kevin of [facility] and was informed Maggy Garrett had denied patient acute rehab, feeling she is more appropriate for SNF rehab  Notified Ayla Aguilar PA-C and she will complete a peer to peer appeal to Maggy Garrett at [phone], option 1 with reference number [id number]  Bridget Alvarado peer to peer appeal decision  CM to follow

## 2022-08-18 NOTE — ASSESSMENT & PLAN NOTE
S/p mechanical fall on to right hip  XR with right femur fracture that is displaced  No head strike, LOC, or other injury  · NV intact on admission  · POD #3 - S/P right long TFNA  · Orthopedic surgery following:  · WBAT to RLE with assistance  · DVT prophylaxis: Lovenox and mechanical  Recommend  mg BID x 6 weeks at discharge  · Ortho stable for discharge to rehab  Follow up as outpatient with Dr Luis Morin in 10-14 days  · Pain control with tylenol, oxycodone, robaxin  · PT/OT recommending acute rehab, CM on board for placement   Will require referrals placed to SNF  · Encourage incentive spirometer use  · Wound care and dressing changes per Orthopedics

## 2022-08-18 NOTE — PLAN OF CARE
Problem: Potential for Falls  Goal: Patient will remain free of falls  Description: INTERVENTIONS:  - Educate patient/family on patient safety including physical limitations  - Instruct patient to call for assistance with activity   - Consult OT/PT to assist with strengthening/mobility   - Keep Call bell within reach  - Keep bed low and locked with side rails adjusted as appropriate  - Keep care items and personal belongings within reach  - Initiate and maintain comfort rounds  - Make Fall Risk Sign visible to staff  - Offer Toileting every 2 Hours, in advance of need  - Obtain necessary fall risk management equipment: nonskid footwear  - Apply yellow socks and bracelet for high fall risk patients  - Consider moving patient to room near nurses station  Outcome: Progressing     Problem: PAIN - ADULT  Goal: Verbalizes/displays adequate comfort level or baseline comfort level  Description: Interventions:  - Encourage patient to monitor pain and request assistance  - Assess pain using appropriate pain scale  - Administer analgesics based on type and severity of pain and evaluate response  - Implement non-pharmacological measures as appropriate and evaluate response  - Consider cultural and social influences on pain and pain management  - Notify physician/advanced practitioner if interventions unsuccessful or patient reports new pain  Outcome: Progressing     Problem: SAFETY ADULT  Goal: Maintain or return to baseline ADL function  Description: INTERVENTIONS:  -  Assess patient's ability to carry out ADLs; assess patient's baseline for ADL function and identify physical deficits which impact ability to perform ADLs (bathing, care of mouth/teeth, toileting, grooming, dressing, etc )  - Assess/evaluate cause of self-care deficits   - Assess range of motion  - Assess patient's mobility; develop plan if impaired  - Assess patient's need for assistive devices and provide as appropriate  - Encourage maximum independence but intervene and supervise when necessary  - Involve family in performance of ADLs  - Assess for home care needs following discharge   - Consider OT consult to assist with ADL evaluation and planning for discharge  - Provide patient education as appropriate  Outcome: Progressing     Problem: DISCHARGE PLANNING  Goal: Discharge to home or other facility with appropriate resources  Description: INTERVENTIONS:  - Identify barriers to discharge w/patient and caregiver  - Arrange for needed discharge resources and transportation as appropriate  - Identify discharge learning needs (meds, wound care, etc )  - Arrange for interpretive services to assist at discharge as needed  - Refer to Case Management Department for coordinating discharge planning if the patient needs post-hospital services based on physician/advanced practitioner order or complex needs related to functional status, cognitive ability, or social support system  Outcome: Progressing     Problem: Knowledge Deficit  Goal: Patient/family/caregiver demonstrates understanding of disease process, treatment plan, medications, and discharge instructions  Description: Complete learning assessment and assess knowledge base    Interventions:  - Provide teaching at level of understanding  - Provide teaching via preferred learning methods  Outcome: Progressing     Problem: METABOLIC, FLUID AND ELECTROLYTES - ADULT  Goal: Electrolytes maintained within normal limits  Description: INTERVENTIONS:  - Monitor labs and assess patient for signs and symptoms of electrolyte imbalances  - Administer electrolyte replacement as ordered  - Monitor response to electrolyte replacements, including repeat lab results as appropriate  - Instruct patient on fluid and nutrition as appropriate  Outcome: Progressing  Goal: Fluid balance maintained  Description: INTERVENTIONS:  - Monitor labs   - Monitor I/O and WT  - Instruct patient on fluid and nutrition as appropriate  - Assess for signs & symptoms of volume excess or deficit  Outcome: Progressing     Problem: SKIN/TISSUE INTEGRITY - ADULT  Goal: Incision(s), wounds(s) or drain site(s) healing without S/S of infection  Description: INTERVENTIONS  - Assess and document dressing, incision, wound bed, drain sites and surrounding tissue  - Provide patient and family education  - Perform skin care/dressing changes every   Outcome: Progressing     Problem: MUSCULOSKELETAL - ADULT  Goal: Maintain or return mobility to safest level of function  Description: INTERVENTIONS:  - Assess patient's ability to carry out ADLs; assess patient's baseline for ADL function and identify physical deficits which impact ability to perform ADLs (bathing, care of mouth/teeth, toileting, grooming, dressing, etc )  - Assess/evaluate cause of self-care deficits   - Assess range of motion  - Assess patient's mobility  - Assess patient's need for assistive devices and provide as appropriate  - Encourage maximum independence but intervene and supervise when necessary  - Involve family in performance of ADLs  - Assess for home care needs following discharge   - Consider OT consult to assist with ADL evaluation and planning for discharge  - Provide patient education as appropriate  Outcome: Progressing     Problem: MOBILITY - ADULT  Goal: Maintain or return to baseline ADL function  Description: INTERVENTIONS:  -  Assess patient's ability to carry out ADLs; assess patient's baseline for ADL function and identify physical deficits which impact ability to perform ADLs (bathing, care of mouth/teeth, toileting, grooming, dressing, etc )  - Assess/evaluate cause of self-care deficits   - Assess range of motion  - Assess patient's mobility; develop plan if impaired  - Assess patient's need for assistive devices and provide as appropriate  - Encourage maximum independence but intervene and supervise when necessary  - Involve family in performance of ADLs  - Assess for home care needs following discharge   - Consider OT consult to assist with ADL evaluation and planning for discharge  - Provide patient education as appropriate  Outcome: Progressing  Goal: Maintains/Returns to pre admission functional level  Description: INTERVENTIONS:  - Perform BMAT or MOVE assessment daily    - Set and communicate daily mobility goal to care team and patient/family/caregiver  - Collaborate with rehabilitation services on mobility goals if consulted  - Perform Range of Motion 3 times a day  - Reposition patient every 2 hours    - Dangle patient 3 times a day  - Stand patient 3 times a day  - Ambulate patient 3 times a day  - Out of bed to chair 3 times a day   - Out of bed for meals 3 times a day  - Out of bed for toileting  - Record patient progress and toleration of activity level   Outcome: Progressing     Problem: Prexisting or High Potential for Compromised Skin Integrity  Goal: Skin integrity is maintained or improved  Description: INTERVENTIONS:  - Identify patients at risk for skin breakdown  - Assess and monitor skin integrity  - Assess and monitor nutrition and hydration status  - Monitor labs   - Assess for incontinence   - Turn and reposition patient  - Assist with mobility/ambulation  - Relieve pressure over bony prominences  - Avoid friction and shearing  - Provide appropriate hygiene as needed including keeping skin clean and dry  - Evaluate need for skin moisturizer/barrier cream  - Collaborate with interdisciplinary team   - Patient/family teaching  - Consider wound care consult   Outcome: Progressing

## 2022-08-18 NOTE — ASSESSMENT & PLAN NOTE
· Acute postoperative blood-loss anemia  · Hgb from 11 3 > 9 1 > 8 3 > 9 5  · Check CBC in 2-3 days on discharge to [facility]  · On iron and vitamin-C

## 2022-08-18 NOTE — PROGRESS NOTES
Nelida Solano  78 y o   female  MR#: [id number]  8/18/2022    Post-op days: 3  Extremity: right hip    Subjective:  Patient seen and examined this morning  Sitting up in a chair  She is doing well  Continues to work with PT and has been getting up more  Denies fevers, chills or sweats  Vitals:   Vitals:    08/18/22 0745   BP: 139/66   Pulse: 90   Resp: 16   Temp: 98 3 °F (36 8 °C)   SpO2: 97%       Exam:   A&O x 3 NAD  Right hip:  Mepilex dressings intact  Most proximal dressing has mild sanguinous drainage showing  Thigh swollen but compressible  Calf soft, nontender  Actively moving ankle and toes  NV intact    Labs:   WBC   Recent Labs     08/17/22  0456 08/18/22  0508   WBC 8 50 7 02     H/H   Recent Labs     08/17/22  0456 08/18/22  0508   HGB 9 1* 8 3*   /  Recent Labs     08/17/22  0456 08/18/22  0508   HCT 29 0* 26 6*     Sed Rate No results for input(s): SEDRATE in the last 72 hours  CRP No results for input(s): CRP in the last 72 hours  Assessment:   S/p right long TFNA    Plan:   WBAT to RLE with assistance  PT/OT  Encourage incentive spirometry  Pain control  DVT prophylaxis: Lovenox and mechanical  Recommend  mg BID x 6 weeks at discharge  ABLA: Hbg 8 3 from 9 1 yesterday  Will continue to monitor vitals and H/H  Asymptomatic at this time  DC planning  Ortho stable for discharge to rehab  Follow up as outpatient with Dr Jesse Diaz in 10-14 days

## 2022-08-18 NOTE — ASSESSMENT & PLAN NOTE
· Home regimen: armour thryoid 165mg daily   · Appears that she is hyperthyroid on her current regimen with TSH of 0 010  · Assessing T4 prior to adjusting dosage

## 2022-08-19 VITALS
HEART RATE: 90 BPM | BODY MASS INDEX: 23.34 KG/M2 | OXYGEN SATURATION: 91 % | SYSTOLIC BLOOD PRESSURE: 114 MMHG | TEMPERATURE: 98.2 F | DIASTOLIC BLOOD PRESSURE: 58 MMHG | WEIGHT: 146.83 LBS | RESPIRATION RATE: 22 BRPM

## 2022-08-19 LAB
ANION GAP SERPL CALCULATED.3IONS-SCNC: 4 MMOL/L (ref 4–13)
BUN SERPL-MCNC: 19 MG/DL (ref 5–25)
CALCIUM SERPL-MCNC: 8.3 MG/DL (ref 8.3–10.1)
CHLORIDE SERPL-SCNC: 105 MMOL/L (ref 96–108)
CO2 SERPL-SCNC: 33 MMOL/L (ref 21–32)
CREAT SERPL-MCNC: 0.68 MG/DL (ref 0.6–1.3)
ERYTHROCYTE [DISTWIDTH] IN BLOOD BY AUTOMATED COUNT: 14.7 % (ref 11.6–15.1)
GFR SERPL CREATININE-BSD FRML MDRD: 81 ML/MIN/1.73SQ M
GLUCOSE SERPL-MCNC: 113 MG/DL (ref 65–140)
HCT VFR BLD AUTO: 24.8 % (ref 34.8–46.1)
HCT VFR BLD AUTO: 27 % (ref 34.8–46.1)
HGB BLD-MCNC: 7.7 G/DL (ref 11.5–15.4)
HGB BLD-MCNC: 8.4 G/DL (ref 11.5–15.4)
MCH RBC QN AUTO: 29.4 PG (ref 26.8–34.3)
MCHC RBC AUTO-ENTMCNC: 31 G/DL (ref 31.4–37.4)
MCV RBC AUTO: 95 FL (ref 82–98)
PLATELET # BLD AUTO: 139 THOUSANDS/UL (ref 149–390)
PMV BLD AUTO: 10.4 FL (ref 8.9–12.7)
POTASSIUM SERPL-SCNC: 4.1 MMOL/L (ref 3.5–5.3)
RBC # BLD AUTO: 2.62 MILLION/UL (ref 3.81–5.12)
SARS-COV-2 RNA RESP QL NAA+PROBE: NEGATIVE
SODIUM SERPL-SCNC: 142 MMOL/L (ref 135–147)
WBC # BLD AUTO: 5.77 THOUSAND/UL (ref 4.31–10.16)

## 2022-08-19 PROCEDURE — U0003 INFECTIOUS AGENT DETECTION BY NUCLEIC ACID (DNA OR RNA); SEVERE ACUTE RESPIRATORY SYNDROME CORONAVIRUS 2 (SARS-COV-2) (CORONAVIRUS DISEASE [COVID-19]), AMPLIFIED PROBE TECHNIQUE, MAKING USE OF HIGH THROUGHPUT TECHNOLOGIES AS DESCRIBED BY CMS-2020-01-R: HCPCS | Performed by: PHYSICIAN ASSISTANT

## 2022-08-19 PROCEDURE — 85027 COMPLETE CBC AUTOMATED: CPT | Performed by: PHYSICIAN ASSISTANT

## 2022-08-19 PROCEDURE — 85014 HEMATOCRIT: CPT | Performed by: PHYSICIAN ASSISTANT

## 2022-08-19 PROCEDURE — 85018 HEMOGLOBIN: CPT | Performed by: PHYSICIAN ASSISTANT

## 2022-08-19 PROCEDURE — 80048 BASIC METABOLIC PNL TOTAL CA: CPT | Performed by: PHYSICIAN ASSISTANT

## 2022-08-19 PROCEDURE — U0005 INFEC AGEN DETEC AMPLI PROBE: HCPCS | Performed by: PHYSICIAN ASSISTANT

## 2022-08-19 PROCEDURE — NC001 PR NO CHARGE: Performed by: PHYSICIAN ASSISTANT

## 2022-08-19 PROCEDURE — 99238 HOSP IP/OBS DSCHRG MGMT 30/<: CPT | Performed by: PHYSICIAN ASSISTANT

## 2022-08-19 PROCEDURE — 99024 POSTOP FOLLOW-UP VISIT: CPT | Performed by: PHYSICIAN ASSISTANT

## 2022-08-19 RX ORDER — ACETAMINOPHEN 325 MG/1
975 TABLET ORAL EVERY 8 HOURS SCHEDULED
Refills: 0
Start: 2022-08-19

## 2022-08-19 RX ORDER — OXYCODONE HYDROCHLORIDE 5 MG/1
5 TABLET ORAL EVERY 6 HOURS PRN
Qty: 20 TABLET | Refills: 0 | Status: SHIPPED | OUTPATIENT
Start: 2022-08-19 | End: 2022-08-29

## 2022-08-19 RX ORDER — ASCORBIC ACID 500 MG
500 TABLET ORAL DAILY
Refills: 0
Start: 2022-08-20

## 2022-08-19 RX ORDER — FERROUS SULFATE 325(65) MG
325 TABLET ORAL
Refills: 0
Start: 2022-08-20

## 2022-08-19 RX ORDER — ASPIRIN 325 MG
325 TABLET, DELAYED RELEASE (ENTERIC COATED) ORAL 2 TIMES DAILY
Qty: 30 TABLET | Refills: 0
Start: 2022-08-19 | End: 2022-09-30

## 2022-08-19 RX ORDER — BISACODYL 10 MG
10 SUPPOSITORY, RECTAL RECTAL DAILY PRN
Status: DISCONTINUED | OUTPATIENT
Start: 2022-08-19 | End: 2022-08-19 | Stop reason: HOSPADM

## 2022-08-19 RX ORDER — BISACODYL 10 MG
10 SUPPOSITORY, RECTAL RECTAL DAILY PRN
Status: DISCONTINUED | OUTPATIENT
Start: 2022-08-19 | End: 2022-08-19

## 2022-08-19 RX ORDER — BISACODYL 10 MG
10 SUPPOSITORY, RECTAL RECTAL DAILY PRN
Qty: 12 SUPPOSITORY | Refills: 0
Start: 2022-08-19

## 2022-08-19 RX ADMIN — ACETAMINOPHEN 325MG 975 MG: 325 TABLET ORAL at 05:33

## 2022-08-19 RX ADMIN — FERROUS SULFATE TAB 325 MG (65 MG ELEMENTAL FE) 325 MG: 325 (65 FE) TAB at 08:52

## 2022-08-19 RX ADMIN — LEVOTHYROXINE, LIOTHYRONINE 15 MG: 9.5; 2.25 TABLET ORAL at 05:32

## 2022-08-19 RX ADMIN — ACETAMINOPHEN 325MG 975 MG: 325 TABLET ORAL at 13:26

## 2022-08-19 RX ADMIN — ENOXAPARIN SODIUM 40 MG: 100 INJECTION SUBCUTANEOUS at 08:51

## 2022-08-19 RX ADMIN — LEVOTHYROXINE, LIOTHYRONINE 30 MG: 19; 4.5 TABLET ORAL at 05:34

## 2022-08-19 RX ADMIN — BISACODYL 10 MG: 10 SUPPOSITORY RECTAL at 13:27

## 2022-08-19 RX ADMIN — OXYCODONE HYDROCHLORIDE AND ACETAMINOPHEN 500 MG: 500 TABLET ORAL at 08:52

## 2022-08-19 RX ADMIN — LEVOTHYROXINE, LIOTHYRONINE 120 MG: 76; 18 TABLET ORAL at 05:32

## 2022-08-19 NOTE — PROGRESS NOTES
Ivette Maria  78 y o   female  MR#: [id number]  8/19/2022    Post-op days: 4  Extremity: right hip    Subjective:  Patient seen and examined this morning  Lying comfortably in bed  No issues overnight  Pain is well controlled  Denies chest pain, shortness of breath, fevers or chills  Vitals:   Vitals:    08/18/22 2210   BP: 145/72   Pulse: (!) 116   Resp: 18   Temp: 99 3 °F (37 4 °C)   SpO2: (!) 86%       Exam:   A&O x 3 NAD  Right hip:  Mepilex dressings intact  Most proximal dressing has mild sanguinous drainage showing  Thigh swollen but compressible  Calf soft, nontender  Actively moving ankle and toes  NV intact    Labs:   WBC   Recent Labs     08/17/22  0456 08/18/22  0508 08/19/22  0535   WBC 8 50 7 02 5 77     H/H   Recent Labs     08/17/22  0456 08/18/22  0508 08/18/22  1256 08/19/22  0535   HGB 9 1* 8 3* 9 5* 7 7*   /  Recent Labs     08/17/22  0456 08/18/22  0508 08/18/22  1256 08/19/22  0535   HCT 29 0* 26 6* 30 5* 24 8*     Sed Rate No results for input(s): SEDRATE in the last 72 hours  CRP No results for input(s): CRP in the last 72 hours  Assessment:   S/p right long TFNA    Plan:   WBAT to RLE with assistance  PT/OT  Encourage incentive spirometry  Pain control  DVT prophylaxis: Lovenox and mechanical  Recommend  mg BID x 6 weeks at discharge  ABLA: Hbg 7 7 from 9 5 yesterday  Will continue to monitor vitals and H/H  Asymptomatic at this time  Transfuse as per primary team   DC planning  Ortho stable for discharge to rehab  Follow up as outpatient with Dr Emili Martinez in 10-14 days

## 2022-08-19 NOTE — CASE MANAGEMENT
Case Management Discharge Planning Note    Patient name Jean Claude Padron  Location [address] 322/-57 MRN [id number]  : 1940 Date 2022       Current Admission Date: 2022  Current Admission Diagnosis:Femur fracture, right [facility])   Patient Active Problem List    Diagnosis Date Noted    Acute blood loss anemia 2022    Femur fracture, right ([address] ) 08/15/2022    Primary hypertension 08/15/2022    Acquired hypothyroidism 08/15/2022    Symptomatic varicose veins of both lower extremities 2021      LOS (days): 5  Geometric Mean LOS (GMLOS) (days): 4 30  Days to GMLOS:-0 5     OBJECTIVE:  Risk of Unplanned Readmission Score: 9 75         Current admission status: Inpatient   Preferred Pharmacy:   [address] [place] [address] #[id number] [address]  Phone: [phone] Fax: [phone]    Primary Care Provider: Liborio Clifton DO    Primary Insurance: Bella Vaz [facility] REP  Secondary Insurance:     DISCHARGE DETAILS:  Continuing to follow patient  Obtained auth from Bella Vaz for SNF rehab with [facility] 22 and next review date 22 with auth # [id number]  Arranged Clarence HERNANDEZS to transport at 5 pm today  Notified patient, left MOM for daughter Magen Guajardo at [phone], Nancy Joshua her nurse of transport time  Notified [address] , nurse supervisor at [facility] at [phone] of the above    IMM discussed signed and copy given to patient                                                                                        IMM Given (Date):: 22  IMM Given to[de-identified] Patient

## 2022-08-19 NOTE — CASE MANAGEMENT
Case Management Discharge Planning Note    Patient name Lindsey Haskins  Location [address] 322/-31 MRN [id number]  : 1940 Date 2022       Current Admission Date: 2022  Current Admission Diagnosis:Femur fracture, right [facility])   Patient Active Problem List    Diagnosis Date Noted    Acute blood loss anemia 2022    Femur fracture, right ([address] ) 08/15/2022    Primary hypertension 08/15/2022    Acquired hypothyroidism 08/15/2022    Symptomatic varicose veins of both lower extremities 2021      LOS (days): 5  Geometric Mean LOS (GMLOS) (days): 4 30  Days to GMLOS:-0 5     OBJECTIVE:  Risk of Unplanned Readmission Score: 9 75         Current admission status: Inpatient   Preferred Pharmacy:   [address] [place] [address] #[id number] [address] [place] [address],[address] [place] [address]  Phone: [phone] Fax: [phone]    Primary Care Provider: Jane Garcia DO    Primary Insurance: Devra Goldmann [facility] RE[facility]  Secondary Insurance:     [address] [place] [address] Number: approved SARAH Neva Rojo #[id number] for [facility]:   NRD:   Care Coord: Tashi Romero  P: [phone]   F: [phone]

## 2022-08-19 NOTE — ASSESSMENT & PLAN NOTE
S/p mechanical fall on to right hip  XR with right femur fracture that is displaced  No head strike, LOC, or other injury  · S/P right long TFNA on 8/15  · Orthopedic surgery following:  · WBAT to RLE with assistance  · DVT prophylaxis: Lovenox and mechanical while inpatient  Recommend  mg BID x 6 weeks at discharge  · Follow-up with Dr Deepak Lopez in 10-14 days    · Pain control well controlled with scheduled tylenol and low dose oxycodone prn   · PT/OT recommending acute rehab on discharge, patient agreeable; arranged via case management   · Encourage incentive spirometer use  · Wound care and dressing changes per Orthopedics

## 2022-08-19 NOTE — PLAN OF CARE
Problem: SAFETY ADULT  Goal: Maintain or return to baseline ADL function  Description: INTERVENTIONS:  -  Assess patient's ability to carry out ADLs; assess patient's baseline for ADL function and identify physical deficits which impact ability to perform ADLs (bathing, care of mouth/teeth, toileting, grooming, dressing, etc )  - Assess/evaluate cause of self-care deficits   - Assess range of motion  - Assess patient's mobility; develop plan if impaired  - Assess patient's need for assistive devices and provide as appropriate  - Encourage maximum independence but intervene and supervise when necessary  - Involve family in performance of ADLs  - Assess for home care needs following discharge   - Consider OT consult to assist with ADL evaluation and planning for discharge  - Provide patient education as appropriate  Outcome: Progressing     Problem: DISCHARGE PLANNING  Goal: Discharge to home or other facility with appropriate resources  Description: INTERVENTIONS:  - Identify barriers to discharge w/patient and caregiver  - Arrange for needed discharge resources and transportation as appropriate  - Identify discharge learning needs (meds, wound care, etc )  - Arrange for interpretive services to assist at discharge as needed  - Refer to Case Management Department for coordinating discharge planning if the patient needs post-hospital services based on physician/advanced practitioner order or complex needs related to functional status, cognitive ability, or social support system  Outcome: Progressing

## 2022-08-19 NOTE — ASSESSMENT & PLAN NOTE
S/p mechanical fall on to right hip  XR with right femur fracture that is displaced  No head strike, LOC, or other injury  · S/P right long TFNA on 8/15  · Orthopedic surgery following:  · WBAT to RLE with assistance  · DVT prophylaxis: Lovenox and mechanical while inpatient  Recommend  mg BID x 6 weeks at discharge  · Follow-up with Dr Forest Amaral in 10-14 days    · Pain control well controlled with scheduled tylenol and low dose oxycodone prn   · PT/OT recommending acute rehab, CM on board for placement  · Encourage incentive spirometer use  · Wound care and dressing changes per Orthopedics

## 2022-08-19 NOTE — PROGRESS NOTES
[place]  Progress Note - Nette Reeves 1940, 78 y o  female MRN: [id number]  Unit/Bed#: -01 Encounter: [id number]  Primary Care Provider: Issa Plaza DO   Date and time admitted to hospital: 8/14/2022  8:30 PM    * Femur fracture, right [facility])  Assessment & Plan  S/p mechanical fall on to right hip  XR with right femur fracture that is displaced  No head strike, LOC, or other injury  · S/P right long TFNA on 8/15  · Orthopedic surgery following:  · WBAT to RLE with assistance  · DVT prophylaxis: Lovenox and mechanical while inpatient  Recommend  mg BID x 6 weeks at discharge  · Follow-up with Dr Gasper Mathew in 10-14 days  · Pain control well controlled with scheduled tylenol and low dose oxycodone prn   · PT/OT recommending acute rehab, CM on board for placement  · Encourage incentive spirometer use  · Wound care and dressing changes per Orthopedics    Acute blood loss anemia  Assessment & Plan  · Acute postoperative blood-loss anemia  · Hgb from 11 3 > 9 1 > 8 3 > 9 5 > 7 7 today   · Mild tachycardia otherwise asymptomatic and hemodynamically stable   · Follow-up repeat H&H this afternoon to ensure stability prior to d/c   · Check CBC in 2-3 days on discharge to rehab   · On iron and vitamin-C    Primary hypertension  Assessment & Plan  · BP acceptable, monitor routinely   · Continue home dose lisinopril 5mg BID    Acquired hypothyroidism  Assessment & Plan  · Continue home regimen: armour thryoid 165mg daily         VTE Pharmacologic Prophylaxis: VTE Score: 8 High Risk (Score >/= 5) - Pharmacological DVT Prophylaxis Ordered: enoxaparin (Lovenox)  Sequential Compression Devices Ordered  Patient Centered Rounds: I performed bedside rounds with nursing staff today  Discussions with Specialists or Other Care Team Provider: primary RN, case management     Education and Discussions with Family / Patient: Patient declined call to        Time Spent for Care: 20 minutes  More than 50% of total time spent on counseling and coordination of care as described above  Current Length of Stay: 5 day(s)  Current Patient Status: Inpatient   Certification Statement: The patient will continue to require additional inpatient hospital stay due to monitoring hemoglobin, pending rehab placement   Discharge Plan: Anticipate discharge later today or tomorrow to rehab facility  Code Status: Level 1 - Full Code    Subjective:   Patient offers no complaints today  States pain in her leg is well controlled  Denies CP, SOB, lightheadedness or dizziness  Notes no BM in several days and is requesting suppository  Objective:     Vitals:   Temp (24hrs), Av 7 °F (37 1 °C), Min:98 2 °F (36 8 °C), Max:99 3 °F (37 4 °C)    Temp:  [98 2 °F (36 8 °C)-99 3 °F (37 4 °C)] 98 2 °F (36 8 °C)  HR:  [] 88  Resp:  [18-22] 22  BP: (108-149)/(53-72) 108/53  SpO2:  [86 %-97 %] 97 %  Body mass index is 23 34 kg/m²  Input and Output Summary (last 24 hours): Intake/Output Summary (Last 24 hours) at 2022 0905  Last data filed at 2022 0824  Gross per 24 hour   Intake 810 ml   Output 675 ml   Net 135 ml       Physical Exam:   Physical Exam  Vitals and nursing note reviewed  Constitutional:       General: She is not in acute distress  Cardiovascular:      Rate and Rhythm: Regular rhythm  Tachycardia present  Comments: HR low 100s   Pulmonary:      Effort: Pulmonary effort is normal  No respiratory distress  Comments: SPO2 high 90s on room air  Musculoskeletal:      Right lower leg: No edema  Left lower leg: No edema  Skin:     General: Skin is warm and dry  Coloration: Skin is not pale  Findings: No erythema  Neurological:      General: No focal deficit present  Mental Status: She is alert and oriented to person, place, and time  Mental status is at baseline           Additional Data:     Labs:  Results from last 7 days   Lab Units 08/19/22  0535 08/18/22  1256 08/18/22  0508   WBC Thousand/uL 5 77  --  7 02   HEMOGLOBIN g/dL 7 7*   < > 8 3*   HEMATOCRIT % 24 8*   < > 26 6*   PLATELETS Thousands/uL 139*  --  123*   NEUTROS PCT %  --   --  77*   LYMPHS PCT %  --   --  11*   MONOS PCT %  --   --  10   EOS PCT %  --   --  2    < > = values in this interval not displayed  Results from last 7 days   Lab Units 08/19/22  0535   SODIUM mmol/L 142   POTASSIUM mmol/L 4 1   CHLORIDE mmol/L 105   CO2 mmol/L 33*   BUN mg/dL 19   CREATININE mg/dL 0 68   ANION GAP mmol/L 4   CALCIUM mg/dL 8 3   GLUCOSE RANDOM mg/dL 113     Results from last 7 days   Lab Units 08/15/22  0527   INR  0 99                   Lines/Drains:  Invasive Devices  Report    Peripheral Intravenous Line  Duration           Peripheral IV 08/15/22 Dorsal (posterior); Left Forearm 3 days          Drain  Duration           External Urinary Catheter 4 days                      Imaging: No pertinent imaging reviewed      Recent Cultures (last 7 days):         Last 24 Hours Medication List:   Current Facility-Administered Medications   Medication Dose Route Frequency Provider Last Rate    acetaminophen  975 mg Oral [facility] Darrell George PA-C      ascorbic acid  500 mg Oral Daily Schuyler Tam MD      bisacodyl  10 mg Rectal Daily PRN Chely Alexander PA-C      enoxaparin  40 mg Subcutaneous Daily Darrell George PA-C      ferrous sulfate  325 mg Oral After Breakfast Schuyler Tam MD      lisinopril  5 mg Oral Once Darrell George PA-C      ondansetron  4 mg Intravenous Q6H PRN Darrell George PA-C      oxyCODONE  2 5 mg Oral Q6H PRN Darrell George PA-C      Or    oxyCODONE  5 mg Oral Q6H PRN Darrell George PA-C      senna  1 tablet Oral HS PRN Darrell George PA-C      thyroid  15 mg Oral Early Morning Darrell George PA-C      And    thyroid  120 mg Oral Early Morning Darrell George PA-C      And    thyroid desiccated  30 mg Oral Early Morning Darrell George PA-C          Today, Patient Was Seen By: Corky Payton YESENIA Alexander    **Please Note: This note may have been constructed using a voice recognition system  **

## 2022-08-19 NOTE — CASE MANAGEMENT
Case Management Discharge Planning Note    Patient name Carlie Boeck  Location [address] 322/-[id number] MRN [id number]  : 1940 Date 2022       Current Admission Date: 2022  Current Admission Diagnosis:Femur fracture, right [facility])   Patient Active Problem List    Diagnosis Date Noted    Acute blood loss anemia 2022    Femur fracture, right ([address] ) 08/15/2022    Primary hypertension 08/15/2022    Acquired hypothyroidism 08/15/2022    Symptomatic varicose veins of both lower extremities 2021      LOS (days): 5  Geometric Mean LOS (GMLOS) (days): 4 30  Days to GMLOS:-0 4     OBJECTIVE:  Risk of Unplanned Readmission Score: 9 72         Current admission status: Inpatient   Preferred Pharmacy:   [address] [place] [address] #[id number] [address],[facility] Floor [address] [place] [address]  Phone: [phone] Fax: [phone]    Primary Care Provider: Rekha Box DO    Primary Insurance: [facility] HOSPITAL REP  Secondary Insurance:     [address] [facility] Number: [address]  for patient to be transferred [facility]  [facility]  Hi - please let mom and Reese know culture grew light staph, see how he did over the weekend.   THanks   SMM         Contacted pt results message was explained. Reese verbalized understanding, reported his skin is improving. RN reinforced to pt to ensure he is getting all the dilute bleach water on all affected areas, inclining his face. Pt verbalized understanding. RN asked pt if it was okay to relay results message to pts mother, pt provided verbal okay. Contacted pts mother, results message was explained. Mom asked questions about bleach baths, RN explained recipe, and reasoning. Mom verbalized understanding and denied questions or concerns.

## 2022-08-19 NOTE — CASE MANAGEMENT
Case Management Discharge Planning Note    Patient name Nette Reeves  Location [address] 322/-76 MRN [id number]  : 1940 Date 2022       Current Admission Date: 2022  Current Admission Diagnosis:Femur fracture, right [facility])   Patient Active Problem List    Diagnosis Date Noted    Acute blood loss anemia 2022    Femur fracture, right ([address] ) 08/15/2022    Primary hypertension 08/15/2022    Acquired hypothyroidism 08/15/2022    Symptomatic varicose veins of both lower extremities 2021      LOS (days): 5  Geometric Mean LOS (GMLOS) (days): 4 30  Days to GMLOS:-0 3     OBJECTIVE:  Risk of Unplanned Readmission Score: 9 72         Current admission status: Inpatient   Preferred Pharmacy:   [facility] #[id number] [address] [place] [id number]  Phone: [phone] Fax: [phone]    Primary Care Provider: Issa Plaza DO    Primary Insurance:  [facility] HOSPITAL REP  Secondary Insurance:     DISCHARGE DETAILS:  Continuing to follow patient  6 SNF's could offer patient a skilled rehab bed, list provided and she decided on [facility]  Request for auth from  made via [employer] to CM support tfor discharge today to [facility] with Dr Lauren Flynn admitting there  Placed request through [address] in [place] for BLS to transport to [facility] today  Wait auth for skilled time and transport time  CM to follow

## 2022-08-19 NOTE — ASSESSMENT & PLAN NOTE
· Acute postoperative blood-loss anemia  · Hgb from 11 3 > 9 1 > 8 3 > 9 5 > 7 7 today   · Mild tachycardia otherwise asymptomatic and hemodynamically stable   · Follow-up repeat H&H this afternoon to ensure stability prior to d/c   · Check CBC in 2-3 days on discharge to rehab   · On iron and vitamin-C

## 2022-08-22 ENCOUNTER — DOCUMENTATION (OUTPATIENT)
Dept: FAMILY MEDICINE CLINIC | Facility: HOSPITAL | Age: 82
End: 2022-08-22

## 2022-08-22 NOTE — UTILIZATION REVIEW
Notification of Discharge   This is a Notification of Discharge from our facility [address]  Please be advised that this patient has been discharge from our facility  Below you will find the admission and discharge date and time including the patients disposition  UTILIZATION REVIEW CONTACT:  Mark Parada  Utilization   Network Utilization Review Department  Phone: [phone] carefully listen to the prompts  All voicemails are confidential   Email: [email or web address]     PHYSICIAN ADVISORY SERVICES:  FOR [id number] REVIEW - MEDICAL NECESSITY DENIAL  Phone: [phone]  Fax: [phone]  Email: [email or web address]     PRESENTATION DATE: 8/14/2022  8:30 PM  OBERVATION ADMISSION DATE:   INPATIENT ADMISSION DATE: 8/14/22  9:39 PM   DISCHARGE DATE: 8/19/2022  6:00 PM  DISPOSITION: Non [facility] SNF/TCU/SNU Non [facility] SNF/TC[facility]/SNU      IMPORTANT INFORMATION:  Send all requests for admission clinical reviews, approved or denied determinations and any other requests to dedicated fax number below belonging to the campus where the patient is receiving treatment   List of dedicated fax numbers:  [address] [place] [address] DENIALS (Administrative/Medical Necessity) [phone]   [address] [place] [address] (Maternity/NICU/Pediatrics) [phone]   [facility] [phone]   [address] [facility] [phone]   [address] [place] [address] [phone]   [address] [place] [address],[address] Floor [address] [phone]   [facility]  [phone]   [address] [place] [address], [facility]  [address] [facility] [address] [facility] [phone]

## 2022-08-30 ENCOUNTER — APPOINTMENT (OUTPATIENT)
Dept: RADIOLOGY | Facility: CLINIC | Age: 82
End: 2022-08-30
Payer: COMMERCIAL

## 2022-08-30 ENCOUNTER — OFFICE VISIT (OUTPATIENT)
Dept: OBGYN CLINIC | Facility: CLINIC | Age: 82
End: 2022-08-30

## 2022-08-30 VITALS
WEIGHT: 128 LBS | HEIGHT: 67 IN | SYSTOLIC BLOOD PRESSURE: 116 MMHG | BODY MASS INDEX: 20.09 KG/M2 | DIASTOLIC BLOOD PRESSURE: 70 MMHG

## 2022-08-30 DIAGNOSIS — Z47.89 AFTERCARE FOLLOWING SURGERY OF THE MUSCULOSKELETAL SYSTEM: ICD-10-CM

## 2022-08-30 DIAGNOSIS — Z47.89 AFTERCARE FOLLOWING SURGERY OF THE MUSCULOSKELETAL SYSTEM: Primary | ICD-10-CM

## 2022-08-30 PROCEDURE — 73552 X-RAY EXAM OF FEMUR 2/>: CPT

## 2022-08-30 PROCEDURE — 99024 POSTOP FOLLOW-UP VISIT: CPT | Performed by: ORTHOPAEDIC SURGERY

## 2022-08-30 NOTE — PROGRESS NOTES
Assessment:     1  Aftercare following surgery of the musculoskeletal system        Plan:     Problem List Items Addressed This Visit        Other    Aftercare following surgery of the musculoskeletal system - Primary     Patient is doing well status post right long TFN   X-rays were reviewed with her that revealed a well-aligned fracture with hardware intact  She is to continue formal physical therapy and remain weight-bearing as tolerated to right lower extremity  Follow-up in 2 months with repeat x-rays  Discussed if she continues to have pain in her right knee from pre-existing osteoarthritis, we can give her a cortisone injection  All questions were answered to patient's satisfaction  Relevant Orders    XR femur 2 vw right         Subjective:     Patient ID: Laisha Cartwright is a 78 y o  female  Chief Complaint: This is an 78-year-old white female who is status post right long TFN on August 15, 2022  She currently resides in a rehab facility  She is doing well  She is getting up and ambulating with a walker  She is getting physical therapy there  Pain is well controlled  She does complain of some aching pain in her right knee from previous osteoarthritis  She denies any fevers or chills  Allergy:  Allergies   Allergen Reactions    Sulfur-Salicylic Acid [Salicylic Acid-Sulfur] Swelling     Medications:  all current active meds have been reviewed  Past Medical History:  Past Medical History:   Diagnosis Date    Arthritis     Disease of thyroid gland     Hypertension      Past Surgical History:  Past Surgical History:   Procedure Laterality Date    [place] OPEN RX FEMUR FX+INTRAMED VIKI Right 8/15/2022    Procedure: INSERTION NAIL IM FEMUR ANTEGRADE (TROCHANTERIC);   Surgeon: Estephanie Padilla MD;  Location: [facility];  Service: Orthopedics     Family History:  Family History   Problem Relation Age of Onset    Heart attack Mother     No Known Problems Father      Social History:  Social History Substance and Sexual Activity   Alcohol Use Not Currently     Social History     Substance and Sexual Activity   Drug Use No     Social History     Tobacco Use   Smoking Status Never Smoker   Smokeless Tobacco Never Used     Review of Systems   Constitutional: Negative  HENT: Negative  Eyes: Negative  Respiratory: Negative  Cardiovascular: Negative  Gastrointestinal: Negative  Endocrine: Negative  Genitourinary: Negative  Musculoskeletal: Positive for arthralgias, gait problem (in a wheelchair) and joint swelling  Skin: Negative  Allergic/Immunologic: Negative  Hematological: Negative  Psychiatric/Behavioral: Negative  Objective:  BP Readings from Last 1 Encounters:   08/30/22 116/70      Wt Readings from Last 1 Encounters:   08/30/22 58 1 kg (128 lb)      BMI:   Estimated body mass index is 20 35 kg/m² as calculated from the following:    Height as of this encounter: 5' 6 5" (1 689 m)  Weight as of this encounter: 58 1 kg (128 lb)  BSA:   Estimated body surface area is 1 66 meters squared as calculated from the following:    Height as of this encounter: 5' 6 5" (1 689 m)  Weight as of this encounter: 58 1 kg (128 lb)  Physical Exam  Constitutional:       General: She is not in acute distress  Appearance: She is well-developed  HENT:      Head: Normocephalic  Eyes:      Conjunctiva/sclera: Conjunctivae normal       Pupils: Pupils are equal, round, and reactive to light  Pulmonary:      Effort: Pulmonary effort is normal  No respiratory distress  Skin:     General: Skin is warm and dry  Neurological:      Mental Status: She is alert and oriented to person, place, and time  Psychiatric:         Behavior: Behavior normal        Right Hip Exam     Other   Erythema: absent  Scars: present (Well-healing incisions with no evidence of infection    No active drainage )  Sensation: normal  Pulse: present    Comments:  Thigh and calf soft and compressible  Good passive ROM with no increased pain  Mild edema extending down to foot            I have personally reviewed pertinent films in PACS and my interpretation is X-rays of the right hip reveal a well-aligned intratrochanteric fracture with long TFN intact  Procedure:  Staples removed from right hip

## 2022-08-30 NOTE — ASSESSMENT & PLAN NOTE
Patient is doing well status post right long TFN   X-rays were reviewed with her that revealed a well-aligned fracture with hardware intact  She is to continue formal physical therapy and remain weight-bearing as tolerated to right lower extremity  Follow-up in 2 months with repeat x-rays  Discussed if she continues to have pain in her right knee from pre-existing osteoarthritis, we can give her a cortisone injection  All questions were answered to patient's satisfaction

## 2022-11-03 ENCOUNTER — OFFICE VISIT (OUTPATIENT)
Dept: OBGYN CLINIC | Facility: CLINIC | Age: 82
End: 2022-11-03

## 2022-11-03 ENCOUNTER — APPOINTMENT (OUTPATIENT)
Dept: RADIOLOGY | Facility: CLINIC | Age: 82
End: 2022-11-03

## 2022-11-03 VITALS
DIASTOLIC BLOOD PRESSURE: 82 MMHG | BODY MASS INDEX: 20.88 KG/M2 | HEIGHT: 67 IN | WEIGHT: 133 LBS | SYSTOLIC BLOOD PRESSURE: 140 MMHG

## 2022-11-03 DIAGNOSIS — Z47.89 AFTERCARE FOLLOWING SURGERY OF THE MUSCULOSKELETAL SYSTEM: ICD-10-CM

## 2022-11-03 DIAGNOSIS — Z47.89 AFTERCARE FOLLOWING SURGERY OF THE MUSCULOSKELETAL SYSTEM: Primary | ICD-10-CM

## 2022-11-03 NOTE — ASSESSMENT & PLAN NOTE
Patient is doing well status post right long TFN   X-rays were reviewed with her that revealed a well-aligned fracture with hardware intact with evidence of healing  She was given a prescription for formal physical therapy today  Offered a cortisone injection to the right knee joint for osteoarthritis, but patient declined  Follow-up in 3 months with repeat x-rays  All questions were answered to patient's satisfaction

## 2022-11-03 NOTE — PROGRESS NOTES
Assessment:     1  Aftercare following surgery of the musculoskeletal system        Plan:     Problem List Items Addressed This Visit        Other    Aftercare following surgery of the musculoskeletal system - Primary     Patient is doing well status post right long TFN   X-rays were reviewed with her that revealed a well-aligned fracture with hardware intact with evidence of healing  She was given a prescription for formal physical therapy today  Offered a cortisone injection to the right knee joint for osteoarthritis, but patient declined  Follow-up in 3 months with repeat x-rays  All questions were answered to patient's satisfaction  Relevant Orders    XR femur 2 vw right    Ambulatory Referral to Physical Therapy         Subjective:     Patient ID: Simeon Quinones is a 78 y o  female  Chief Complaint: This is an 78-year-old white female who is status post right long TFN on August 15, 2022  She is doing well  She currently resides at home  She is ambulating with a cane  She has not done any outpatient physical therapy  She does continue to have some right knee pain, aching anteriorly  She denies any pain in the right hip  Allergy:  Allergies   Allergen Reactions   • Sulfur-Salicylic Acid [Salicylic Acid-Sulfur] Swelling     Medications:  all current active meds have been reviewed  Past Medical History:  Past Medical History:   Diagnosis Date   • Arthritis    • Disease of thyroid gland    • Hypertension      Past Surgical History:  Past Surgical History:   Procedure Laterality Date   • [place] OPEN RX FEMUR FX+INTRAMED VIKI Right 8/15/2022    Procedure: INSERTION NAIL IM FEMUR ANTEGRADE (TROCHANTERIC);   Surgeon: Arsalan Chase MD;  Location: [facility];  Service: Orthopedics     Family History:  Family History   Problem Relation Age of Onset   • Heart attack Mother    • No Known Problems Father      Social History:  Social History     Substance and Sexual Activity   Alcohol Use Not Currently Social History     Substance and Sexual Activity   Drug Use No     Social History     Tobacco Use   Smoking Status Never Smoker   Smokeless Tobacco Never Used     Review of Systems   Constitutional: Negative  HENT: Negative  Eyes: Negative  Respiratory: Negative  Cardiovascular: Negative  Gastrointestinal: Negative  Endocrine: Negative  Genitourinary: Negative  Musculoskeletal: Positive for arthralgias (right knee) and gait problem (using a cane)  Negative for joint swelling  Skin: Negative  Allergic/Immunologic: Negative  Hematological: Negative  Psychiatric/Behavioral: Negative  Objective:  BP Readings from Last 1 Encounters:   11/03/22 140/82      Wt Readings from Last 1 Encounters:   11/03/22 60 3 kg (133 lb)      BMI:   Estimated body mass index is 21 15 kg/m² as calculated from the following:    Height as of this encounter: 5' 6 5" (1 689 m)  Weight as of this encounter: 60 3 kg (133 lb)  BSA:   Estimated body surface area is 1 69 meters squared as calculated from the following:    Height as of this encounter: 5' 6 5" (1 689 m)  Weight as of this encounter: 60 3 kg (133 lb)  Physical Exam  Constitutional:       General: She is not in acute distress  Appearance: She is well-developed  HENT:      Head: Normocephalic  Eyes:      Conjunctiva/sclera: Conjunctivae normal       Pupils: Pupils are equal, round, and reactive to light  Pulmonary:      Effort: Pulmonary effort is normal  No respiratory distress  Musculoskeletal:      Right knee: No effusion  Skin:     General: Skin is warm and dry  Neurological:      Mental Status: She is alert and oriented to person, place, and time     Psychiatric:         Behavior: Behavior normal        Right Knee Exam     Tenderness   Right knee tenderness location: patellofemoral     Range of Motion   Extension: -5   Flexion: 130     Tests   Varus: negative Valgus: negative    Other   Erythema: absent  Scars: absent  Sensation: normal  Pulse: present  Swelling: mild  Effusion: no effusion present    Comments:  Patellofemoral crepitation      Right Hip Exam     Tenderness   The patient is experiencing no tenderness  Other   Erythema: absent  Scars: present (Well-healed incisions with no evidence of infection  )  Sensation: normal  Pulse: present    Comments:  Thigh and calf soft and compressible  Good passive ROM with no increased pain  Mild edema extending down to foot            I have personally reviewed pertinent films in PACS and my interpretation is X-rays of the right hip reveal a well-aligned intertrochanteric fracture with long TFN intact  There is evidence of healing compared to last films

## 2022-11-30 ENCOUNTER — HOSPITAL ENCOUNTER (OUTPATIENT)
Dept: RADIOLOGY | Facility: HOSPITAL | Age: 82
Discharge: HOME | End: 2022-11-30

## 2022-11-30 DIAGNOSIS — M25.511 RIGHT SHOULDER PAIN, UNSPECIFIED CHRONICITY: ICD-10-CM

## 2023-02-09 ENCOUNTER — APPOINTMENT (OUTPATIENT)
Dept: RADIOLOGY | Facility: CLINIC | Age: 83
End: 2023-02-09

## 2023-02-09 ENCOUNTER — OFFICE VISIT (OUTPATIENT)
Dept: OBGYN CLINIC | Facility: CLINIC | Age: 83
End: 2023-02-09

## 2023-02-09 VITALS
DIASTOLIC BLOOD PRESSURE: 72 MMHG | SYSTOLIC BLOOD PRESSURE: 150 MMHG | BODY MASS INDEX: 21.82 KG/M2 | HEIGHT: 67 IN | WEIGHT: 139 LBS

## 2023-02-09 DIAGNOSIS — Z47.89 AFTERCARE FOLLOWING SURGERY OF THE MUSCULOSKELETAL SYSTEM: ICD-10-CM

## 2023-02-09 DIAGNOSIS — Z47.89 AFTERCARE FOLLOWING SURGERY OF THE MUSCULOSKELETAL SYSTEM: Primary | ICD-10-CM

## 2023-02-09 PROBLEM — S72.91XA FEMUR FRACTURE, RIGHT (HCC): Status: RESOLVED | Noted: 2022-08-15 | Resolved: 2023-02-09

## 2023-02-09 NOTE — PROGRESS NOTES
Assessment:     1  Aftercare following surgery of the musculoskeletal system        Plan:     Problem List Items Addressed This Visit        Other    Aftercare following surgery of the musculoskeletal system - Primary     Patient is 6 months s/p right long TFNA, DOS 8/15/22, patient is doing well  X-ray right femur was reviewed in the office today  She is to continue with home exercises from physical therapy  She has no restrictions  She is to follow up PRN  All patient's questions were answered to her satisfaction  This note is created using dictation transcription  It may contain typographical errors, grammatical errors, improperly dictated words, background noise and other errors  Relevant Orders    XR femur 2 vw right       Subjective:     Patient ID: [id number] Maher is a 78 y o  female  Chief Complaint:  77-year-old female presents today status post post right long TFN,DOS 8/15/22  Patient states she is doing well  She denies any pain or discomfort in the right hip  She notes occasional right knee pain  She has finished with physical therapy and is occasionally doing the home exercises  Denies numbness or tinging  Allergy:  Allergies   Allergen Reactions   • Sulfur-Salicylic Acid [Salicylic Acid-Sulfur] Swelling     Medications:  all current active meds have been reviewed  Past Medical History:  Past Medical History:   Diagnosis Date   • Arthritis    • Disease of thyroid gland    • Femur fracture, right ([address] ) 8/15/2022   • Hypertension      Past Surgical History:  Past Surgical History:   Procedure Laterality Date   • ME OPTX FEM SHFT FX W/INSJ IMED IMPLT W/WO SCREW Right 8/15/2022    Procedure: INSERTION NAIL IM FEMUR ANTEGRADE (TROCHANTERIC);   Surgeon: Meagan Coulter MD;  Location:  MAIN OR;  Service: Orthopedics     Family History:  Family History   Problem Relation Age of Onset   • Heart attack Mother    • No Known Problems Father      Social History:  Social History     Substance and Sexual Activity   Alcohol Use Not Currently     Social History     Substance and Sexual Activity   Drug Use No     Social History     Tobacco Use   Smoking Status Never   Smokeless Tobacco Never     Review of Systems   Constitutional: Negative for chills and fever  HENT: Negative for ear pain and sore throat  Eyes: Negative for pain and visual disturbance  Respiratory: Negative for cough and shortness of breath  Cardiovascular: Negative for chest pain and palpitations  Gastrointestinal: Negative for abdominal pain and vomiting  Genitourinary: Negative for dysuria and hematuria  Musculoskeletal: Negative  Negative for arthralgias and gait problem (Ambulate with a cane)  Skin: Negative for color change and rash  Neurological: Negative for seizures and syncope  Psychiatric/Behavioral: Negative  All other systems reviewed and are negative  Objective:  BP Readings from Last 1 Encounters:   02/09/23 150/72      Wt Readings from Last 1 Encounters:   02/09/23 63 kg (139 lb)      BMI:   Estimated body mass index is 22 1 kg/m² as calculated from the following:    Height as of this encounter: 5' 6 5" (1 689 m)  Weight as of this encounter: 63 kg (139 lb)  BSA:   Estimated body surface area is 1 72 meters squared as calculated from the following:    Height as of this encounter: 5' 6 5" (1 689 m)  Weight as of this encounter: 63 kg (139 lb)  Physical Exam  Vitals and nursing note reviewed  Constitutional:       Appearance: Normal appearance  She is well-developed  HENT:      Head: Normocephalic and atraumatic  Right Ear: External ear normal       Left Ear: External ear normal    Eyes:      Extraocular Movements: Extraocular movements intact  Conjunctiva/sclera: Conjunctivae normal    Pulmonary:      Effort: Pulmonary effort is normal    Musculoskeletal:      Cervical back: Neck supple  Skin:     General: Skin is warm and dry     Neurological:      Mental Status: She is alert and oriented to person, place, and time  Deep Tendon Reflexes: Reflexes are normal and symmetric  Psychiatric:         Mood and Affect: Mood normal          Behavior: Behavior normal        Right Hip Exam     Tenderness   The patient is experiencing no tenderness  Range of Motion   The patient has normal right hip ROM  Muscle Strength   The patient has normal right hip strength  Tests   HUMBERTO: negative    Other   Erythema: absent  Scars: present (well healed surgical incision )  Sensation: normal  Pulse: present            I have personally reviewed pertinent films in PACS and my interpretation is x-ray right femur demonstrates s/p TFN, no evidence of hardware faliure , fracture well healed        Scribe Attestation    I,:  Ame Hester am acting as a scribe while in the presence of the attending physician :       I,:  Tracee Cunha MD personally performed the services described in this documentation    as scribed in my presence :

## 2023-02-09 NOTE — ASSESSMENT & PLAN NOTE
Patient is 6 months s/p right long TFNA, DOS 8/15/22, patient is doing well  X-ray right femur was reviewed in the office today  She is to continue with home exercises from physical therapy  She has no restrictions  She is to follow up PRN  All patient's questions were answered to her satisfaction  This note is created using dictation transcription  It may contain typographical errors, grammatical errors, improperly dictated words, background noise and other errors

## 2023-09-02 NOTE — ASSESSMENT & PLAN NOTE
· Acute postoperative blood-loss anemia  · Hgb from 11 3 > 9 1 > 8 3 > 9 5 > 7 7 > 8 4 at time of d/c   · Mild tachycardia otherwise asymptomatic and hemodynamically stable   · Follow-up repeat H&H this afternoon to ensure stability prior to d/c   · Check CBC in 2-3 days on discharge to rehab   · On iron and vitamin-C 0 (no pain/absence of nonverbal indicators of pain)

## 2025-02-05 ENCOUNTER — HOSPITAL ENCOUNTER (OUTPATIENT)
Dept: ULTRASOUND IMAGING | Facility: HOSPITAL | Age: 85
Discharge: HOME | End: 2025-02-05
Payer: COMMERCIAL

## 2025-02-05 DIAGNOSIS — Z13.6 ENCOUNTER FOR SCREENING FOR CARDIOVASCULAR DISORDERS: ICD-10-CM

## 2025-02-05 PROCEDURE — 76706 US ABDL AORTA SCREEN AAA: CPT

## (undated) DEVICE — SUT VICRYL 0 CT-1 27 IN J260H

## (undated) DEVICE — ASTOUND STANDARD SURGICAL GOWN, XXL: Brand: CONVERTORS

## (undated) DEVICE — 3.2MM GUIDE WIRE 400MM

## (undated) DEVICE — HEAVY DUTY TABLE COVER: Brand: CONVERTORS

## (undated) DEVICE — 2.5MM REAMING ROD WITH BALL TIP/950MM-STERILE

## (undated) DEVICE — STANDARD SURGICAL GOWN, L: Brand: CONVERTORS

## (undated) DEVICE — GLOVE SRG BIOGEL 7

## (undated) DEVICE — 1820 FOAM BLOCK NEEDLE COUNTER: Brand: DEVON

## (undated) DEVICE — CHLORAPREP HI-LITE 26ML ORANGE

## (undated) DEVICE — SKIN MARKER DUAL TIP WITH RULER CAP, FLEXIBLE RULER AND LABELS: Brand: DEVON

## (undated) DEVICE — GLOVE SRG BIOGEL 7.5

## (undated) DEVICE — PENCIL ELECTROSURG E-Z CLEAN -0035H

## (undated) DEVICE — INTENDED FOR TISSUE SEPARATION, AND OTHER PROCEDURES THAT REQUIRE A SHARP SURGICAL BLADE TO PUNCTURE OR CUT.: Brand: BARD-PARKER SAFETY BLADES SIZE 15, STERILE

## (undated) DEVICE — PROXIMATE PLUS MD MULTI-DIRECTIONAL RELEASE SKIN STAPLERS CONTAINS 35 STAINLESS STEEL STAPLES APPROXIMATE CLOSED DIMENSIONS: 6.9MM X 3.9MM WIDE: Brand: PROXIMATE

## (undated) DEVICE — GLOVE SRG BIOGEL ORTHOPEDIC 7.5

## (undated) DEVICE — SUT VICRYL 2-0 CT-2 27 IN J269H

## (undated) DEVICE — GLOVE INDICATOR PI UNDERGLOVE SZ 7 BLUE

## (undated) DEVICE — BULB SYRINGE,IRRIGATION WITH PROTECTIVE CAP: Brand: DOVER

## (undated) DEVICE — 4.2MM THREE-FLUTED DRILL BIT QC/NEEDLE POINT/145MM-STERILE

## (undated) DEVICE — CAST PADDING 4 IN STERILE

## (undated) DEVICE — GLOVE INDICATOR PI UNDERGLOVE SZ 8 BLUE

## (undated) DEVICE — SPONGE LAP 18 X 18 IN STRL RFD

## (undated) DEVICE — DRESSING MEPILEX AG BORDER 4 X 4 IN

## (undated) DEVICE — 6617 IOBAN II PATIENT ISOLATION DRAPE 5/BX,4BX/CS: Brand: STERI-DRAPE™ IOBAN™ 2